# Patient Record
Sex: FEMALE | Race: AMERICAN INDIAN OR ALASKA NATIVE
[De-identification: names, ages, dates, MRNs, and addresses within clinical notes are randomized per-mention and may not be internally consistent; named-entity substitution may affect disease eponyms.]

---

## 2021-04-16 ENCOUNTER — HOSPITAL ENCOUNTER (INPATIENT)
Dept: HOSPITAL 5 - ED | Age: 71
LOS: 5 days | Discharge: HOSPICE | DRG: 208 | End: 2021-04-21
Attending: INTERNAL MEDICINE | Admitting: INTERNAL MEDICINE
Payer: SELF-PAY

## 2021-04-16 DIAGNOSIS — I67.2: ICD-10-CM

## 2021-04-16 DIAGNOSIS — U07.1: Primary | ICD-10-CM

## 2021-04-16 DIAGNOSIS — E43: ICD-10-CM

## 2021-04-16 DIAGNOSIS — C79.31: ICD-10-CM

## 2021-04-16 DIAGNOSIS — E87.1: ICD-10-CM

## 2021-04-16 DIAGNOSIS — Z82.49: ICD-10-CM

## 2021-04-16 DIAGNOSIS — Z79.891: ICD-10-CM

## 2021-04-16 DIAGNOSIS — C34.90: ICD-10-CM

## 2021-04-16 DIAGNOSIS — I63.9: ICD-10-CM

## 2021-04-16 DIAGNOSIS — G93.41: ICD-10-CM

## 2021-04-16 DIAGNOSIS — E87.8: ICD-10-CM

## 2021-04-16 DIAGNOSIS — R29.726: ICD-10-CM

## 2021-04-16 DIAGNOSIS — F03.90: ICD-10-CM

## 2021-04-16 DIAGNOSIS — E16.2: ICD-10-CM

## 2021-04-16 DIAGNOSIS — Z79.01: ICD-10-CM

## 2021-04-16 DIAGNOSIS — Z63.4: ICD-10-CM

## 2021-04-16 DIAGNOSIS — J96.01: ICD-10-CM

## 2021-04-16 DIAGNOSIS — Z79.899: ICD-10-CM

## 2021-04-16 LAB
ALBUMIN SERPL-MCNC: 3.7 G/DL (ref 3.9–5)
ALT SERPL-CCNC: 16 UNITS/L (ref 7–56)
APTT BLD: 26 SEC. (ref 24.2–36.6)
BASOPHILS # (AUTO): 0 K/MM3 (ref 0–0.1)
BASOPHILS NFR BLD AUTO: 0.1 % (ref 0–1.8)
BENZODIAZEPINES SCREEN,URINE: (no result)
BILIRUB UR QL STRIP: (no result)
BLOOD UR QL VISUAL: (no result)
BUN SERPL-MCNC: 9 MG/DL (ref 7–17)
BUN/CREAT SERPL: 15 %
CALCIUM SERPL-MCNC: 8.9 MG/DL (ref 8.4–10.2)
EOSINOPHIL # BLD AUTO: 0 K/MM3 (ref 0–0.4)
EOSINOPHIL NFR BLD AUTO: 0 % (ref 0–4.3)
HCT VFR BLD CALC: 43.7 % (ref 30.3–42.9)
HEMOLYSIS INDEX: 46
HGB BLD-MCNC: 14.7 GM/DL (ref 10.1–14.3)
INR PPP: 0.98 (ref 0.87–1.13)
LYMPHOCYTES # BLD AUTO: 0.7 K/MM3 (ref 1.2–5.4)
LYMPHOCYTES NFR BLD AUTO: 6.2 % (ref 13.4–35)
MCHC RBC AUTO-ENTMCNC: 34 % (ref 30–34)
MCV RBC AUTO: 83 FL (ref 79–97)
METHADONE SCREEN,URINE: (no result)
MONOCYTES # (AUTO): 0.5 K/MM3 (ref 0–0.8)
MONOCYTES % (AUTO): 4.5 % (ref 0–7.3)
MUCOUS THREADS #/AREA URNS HPF: (no result) /HPF
OPIATE SCREEN,URINE: (no result)
PH UR STRIP: 6 [PH] (ref 5–7)
PLATELET # BLD: 294 K/MM3 (ref 140–440)
RBC # BLD AUTO: 5.27 M/MM3 (ref 3.65–5.03)
RBC #/AREA URNS HPF: 1 /HPF (ref 0–6)
UROBILINOGEN UR-MCNC: < 2 MG/DL (ref ?–2)
WBC #/AREA URNS HPF: 1 /HPF (ref 0–6)

## 2021-04-16 PROCEDURE — 80307 DRUG TEST PRSMV CHEM ANLYZR: CPT

## 2021-04-16 PROCEDURE — 0BH17EZ INSERTION OF ENDOTRACHEAL AIRWAY INTO TRACHEA, VIA NATURAL OR ARTIFICIAL OPENING: ICD-10-PCS | Performed by: INTERNAL MEDICINE

## 2021-04-16 PROCEDURE — 82553 CREATINE MB FRACTION: CPT

## 2021-04-16 PROCEDURE — 82805 BLOOD GASES W/O2 SATURATION: CPT

## 2021-04-16 PROCEDURE — 80320 DRUG SCREEN QUANTALCOHOLS: CPT

## 2021-04-16 PROCEDURE — 96375 TX/PRO/DX INJ NEW DRUG ADDON: CPT

## 2021-04-16 PROCEDURE — 80048 BASIC METABOLIC PNL TOTAL CA: CPT

## 2021-04-16 PROCEDURE — 94002 VENT MGMT INPAT INIT DAY: CPT

## 2021-04-16 PROCEDURE — 82962 GLUCOSE BLOOD TEST: CPT

## 2021-04-16 PROCEDURE — 96374 THER/PROPH/DIAG INJ IV PUSH: CPT

## 2021-04-16 PROCEDURE — 85025 COMPLETE CBC W/AUTO DIFF WBC: CPT

## 2021-04-16 PROCEDURE — 93005 ELECTROCARDIOGRAM TRACING: CPT

## 2021-04-16 PROCEDURE — 86140 C-REACTIVE PROTEIN: CPT

## 2021-04-16 PROCEDURE — 85670 THROMBIN TIME PLASMA: CPT

## 2021-04-16 PROCEDURE — 87205 SMEAR GRAM STAIN: CPT

## 2021-04-16 PROCEDURE — 36415 COLL VENOUS BLD VENIPUNCTURE: CPT

## 2021-04-16 PROCEDURE — 83735 ASSAY OF MAGNESIUM: CPT

## 2021-04-16 PROCEDURE — 74018 RADEX ABDOMEN 1 VIEW: CPT

## 2021-04-16 PROCEDURE — 81001 URINALYSIS AUTO W/SCOPE: CPT

## 2021-04-16 PROCEDURE — 4A033R1 MEASUREMENT OF ARTERIAL SATURATION, PERIPHERAL, PERCUTANEOUS APPROACH: ICD-10-PCS | Performed by: INTERNAL MEDICINE

## 2021-04-16 PROCEDURE — 82140 ASSAY OF AMMONIA: CPT

## 2021-04-16 PROCEDURE — 84100 ASSAY OF PHOSPHORUS: CPT

## 2021-04-16 PROCEDURE — 84443 ASSAY THYROID STIM HORMONE: CPT

## 2021-04-16 PROCEDURE — 85007 BL SMEAR W/DIFF WBC COUNT: CPT

## 2021-04-16 PROCEDURE — G0480 DRUG TEST DEF 1-7 CLASSES: HCPCS

## 2021-04-16 PROCEDURE — 5A1945Z RESPIRATORY VENTILATION, 24-96 CONSECUTIVE HOURS: ICD-10-PCS | Performed by: INTERNAL MEDICINE

## 2021-04-16 PROCEDURE — U0003 INFECTIOUS AGENT DETECTION BY NUCLEIC ACID (DNA OR RNA); SEVERE ACUTE RESPIRATORY SYNDROME CORONAVIRUS 2 (SARS-COV-2) (CORONAVIRUS DISEASE [COVID-19]), AMPLIFIED PROBE TECHNIQUE, MAKING USE OF HIGH THROUGHPUT TECHNOLOGIES AS DESCRIBED BY CMS-2020-01-R: HCPCS

## 2021-04-16 PROCEDURE — 71045 X-RAY EXAM CHEST 1 VIEW: CPT

## 2021-04-16 PROCEDURE — 87070 CULTURE OTHR SPECIMN AEROBIC: CPT

## 2021-04-16 PROCEDURE — 82550 ASSAY OF CK (CPK): CPT

## 2021-04-16 PROCEDURE — 85730 THROMBOPLASTIN TIME PARTIAL: CPT

## 2021-04-16 PROCEDURE — 36600 WITHDRAWAL OF ARTERIAL BLOOD: CPT

## 2021-04-16 PROCEDURE — 70450 CT HEAD/BRAIN W/O DYE: CPT

## 2021-04-16 PROCEDURE — 93970 EXTREMITY STUDY: CPT

## 2021-04-16 PROCEDURE — 70496 CT ANGIOGRAPHY HEAD: CPT

## 2021-04-16 PROCEDURE — 84484 ASSAY OF TROPONIN QUANT: CPT

## 2021-04-16 PROCEDURE — 85610 PROTHROMBIN TIME: CPT

## 2021-04-16 PROCEDURE — 80053 COMPREHEN METABOLIC PANEL: CPT

## 2021-04-16 PROCEDURE — 70498 CT ANGIOGRAPHY NECK: CPT

## 2021-04-16 PROCEDURE — 94003 VENT MGMT INPAT SUBQ DAY: CPT

## 2021-04-16 PROCEDURE — 84145 PROCALCITONIN (PCT): CPT

## 2021-04-16 SDOH — SOCIAL STABILITY - SOCIAL INSECURITY: DISSAPEARANCE AND DEATH OF FAMILY MEMBER: Z63.4

## 2021-04-16 NOTE — CAT SCAN REPORT
CTA neck without and with intravenous contrast material



CLINICAL HISTORY:



MAIN



TECHNIQUE:



Following acquisition of a timing bolus 0.625 mm thick contiguous axial scans were obtained from aort
ic arch to the skull base during rapid bolus intravenous contrast infusion. In addition to evaluation
 of axial source images multiplanar reconstructions were produced and reviewed for this report. 3 eva
ne MIP reconstructions were produced and reviewed.



Contrast dose report:



Omnipaque 350:  100 ml, administered intravenously



All CT examinations performed at this facility utilize modulated dose reduction, iterative reconstruc
tion or weight-based dosing, as appropriate, to obtain a radiation dose which is as low as can reason
ably be achieved.





FINDINGS:



Thoracic aorta:No abnormalities are identified along the course of the thoracic aorta..The origins of
 the great vessels have an unremarkable appearance. Brachiocephalic artery, left common carotid arter
y origin and left subclavian artery all have an unremarkable appearance.



Right carotid artery:No abnormalities are seen along the course of the RCCA, at the right carotid bif
urcation or along the cervical portions of the SHYANN.



Left carotid artery: No abnormalities are noted along the course of the left common carotid artery, a
t the left carotid bifurcation or along the course of the cervical segments of the LICA.



Posterior circulation:The vertebral arteries have an unremarkable appearance. Both vertebral arteries
 contribute to the basilar artery origin. The basilar artery has an unremarkable appearance.



The degree of stenosis, if any, is determined utilizing NASCET like criteria.  In this case there is 
no indication of hemodynamically significant stenosis at the carotid bifurcations or elsewhere.



Evaluation of the lungs is remarkable for multiple (too numerous to count) metastatic deposits rangin
g in size from several millimeters in diameter up to 3.5 cm in greatest dimension. Additionally noted
 is a large is dominant mass located posterior to the armin. Based on location of the nasogastric tu
be this large mass displaces the esophagus infarct the left of the midline. The mass produces promine
nt compression of the right and left main bronchi, left lower lobe bronchus and bronchus intermedius.
 These findings reflect the presence of extensive pulmonary and mediastinal metastases.



Multiple low-attenuation lesions are seen within the thyroid gland. Multinodular goiter could be cons
idered. In light of findings in the lung apices the possibility of metastatic disease to the thyroid 
gland is considered. There is a large mass in the right supraclavicular fossa likely a conglomerate n
odal metastasis. Multiple subcutaneous metastases are also observed.



Evaluation of the cervical spine is remarkable for widespread cervical spondylosis.



IMPRESSION:





1. No indication of hemodynamically significant stenosis at the carotid bifurcations or elsewhere.

2. Widespread metastatic disease to lung, superior mediastinum, right supraclavicular fossa and possi
igor thyroid gland. In addition innumerable subcutaneous metastases are identified. 





============================================

IMPORTANT FINDING:

Time of Communication (CST/CDT): 1600 hours Central standard time

Licensed Practitioner Receiving Report: Dr. Nunez of the [facility] emergency d
epartment. 







============================================





Signer Name: Boyd Davis MD 

Signed: 4/16/2021 5:05 PM

Workstation Name: [id number]

## 2021-04-16 NOTE — HISTORY AND PHYSICAL REPORT
History of Present Illness


Chief complaint: 





Unresponsive


History of present illness: 


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 

Atherosclerosis, Metastatic Lung Neoplasm presents to ED for evaluation.  

Patient is intubated and on ventilatory support at the time my evaluation and is

unable to provide history.  Patient history is provided by EMS staff, ED staff, 

as well as patient daughter who was available by telephone to discuss patient 

history.  As per daughter, the patient was in her usual state of health with a 

no last known well time of 2000 hrs. overnight.  Patient was found unresponsive 

by family this morning.  EMS was notified and upon arrival the patient was found

to be in distress and subsequently transported to [facility] for further care and 

evaluation of the aforementioned symptoms.  The patient was seen and evaluated 

in the emergency department.  All lab and imaging studies reviewed.  The patient

was found to have a focal neurologic deficit with symptoms consistent with CVA. 

The patient was also found to have hypoxemia as well as an inability to protect 

her airway and was intubated and placed on ventilatory support.  The patient was

also found to have acute encephalopathy, as well as metastatic lung cancer.  The

patient was admitted to ICU due to increased risk of the development of multiple

organ system failure.  Patient found to have poor prognosis.  Patient daughter 

denies reports of fever, chills, chest pain, palpitation, productive cough, skin

rash, recent ill contacts, or known exposure to COVID-19.  No prior admission 

for review.  No medication listed for reconciliation at the time of my 

admission.  Advanced care planning conducted in ED. neurology team consulted in 

ED.  Critical care team notified in ED.





Past History


Past Medical History: cancer, stroke


Past Surgical History: No surgical history, Other (Reviewed)


Social history: .  denies: smoking, alcohol abuse, prescription drug 

abuse


Family history: hypertension





Medications and Allergies


                                    Allergies











Allergy/AdvReac Type Severity Reaction Status Date / Time


 


No Known Allergies Allergy   Unverified 04/16/21 14:09











Active Meds: 


Active Medications





Hydrophilic Ointment (Lip Therapy Vaseline)  1 applic TP Q2HR PRN


   PRN Reason: Dry Lips


Propofol (Diprivan 10 Mg/Ml)  1,000 mg in 100 mls @ 0 mls/hr IV TITR ANEL; 

Protocol


Sodium Chloride (Nacl 0.9% 1000 Ml)  1,000 mls @ 125 mls/hr IV ONCE ONE


   Stop: 04/16/21 22:46


   Last Admin: 04/16/21 16:03 Dose:  125 mls/hr


   Documented by: 


Multi-Ingred Cream/Lotion/Oil/Oint (Mineral Oil/Petrolatum, White Ophth Oint 3.5

Gm)  1 applic OU Q4HR PRN


   PRN Reason: Dry Eye(s)











Review of Systems


ROS unobtainable: due to endotracheal tube, due to mental status





Exam





- Constitutional


Vitals: 


                                        











Temp Pulse Resp BP Pulse Ox


 


    80   14   104/53   95 


 


    04/16/21 16:56  04/16/21 15:30  04/16/21 16:56  04/16/21 16:56











General appearance: Present: severe distress, cachectic





- EENT


Eyes: Present: miosis


ENT: hearing decreased





- Neck


Neck: Present: supple, normal ROM





- Respiratory


Respiratory effort: labored


Respiratory: bilateral: diminished, rhonchi





- Cardiovascular


Heart Sounds: Present: S1 & S2.  Absent: rub, click





- Extremities


Extremities: pulses symmetrical, No edema


Peripheral Pulses: within normal limits





- Abdominal


General gastrointestinal: Present: soft, non-tender, non-distended, normal bowel

sounds


Female genitourinary: Present: normal





- Musculoskeletal


Musculoskeletal: generalized weakness





- Psychiatric


Psychiatric: no appropriate mood/affect, no intact judgment & insight, no memory

intact





- Neurologic


Neurologic: no CNII-XII intact, focal deficits, no moves all extremities, no 

gait normal





HEART Score





- HEART Score


Troponin: 


                                        











Troponin T  0.010 ng/mL (0.00-0.029)   04/16/21  14:06    














Results





- Labs


CBC & Chem 7: 


                                 04/16/21 14:06





                                 04/16/21 14:06


Labs: 


                              Abnormal lab results











  04/16/21 04/16/21 04/16/21 Range/Units





  14:00 14:06 14:06 


 


RBC   5.27 H   (3.65-5.03)  M/mm3


 


Hgb   14.7 H   (10.1-14.3)  gm/dl


 


Hct   43.7 H   (30.3-42.9)  %


 


RDW   15.8 H   (13.2-15.2)  %


 


Lymph % (Auto)   6.2 L   (13.4-35.0)  %


 


Lymph # (Auto)   0.7 L   (1.2-5.4)  K/mm3


 


Seg Neutrophils %   89.2 H   (40.0-70.0)  %


 


Seg Neutrophils #   9.5 H   (1.8-7.7)  K/mm3


 


Thrombin Time    19.8 H  (15.1-19.6)  Sec.


 


POC ABG pO2     ()  mmHg


 


ABG Oxyhemoglobin     (94-98)  


 


ABG Glucose     (65-95)  mg/dL


 


Sodium     (137-145)  mmol/L


 


Chloride     ()  mmol/L


 


Glucose     ()  mg/dL


 


POC Glucose  150 H    ()  mg/dL


 


Total Creatine Kinase     ()  units/L


 


Total Protein     (6.3-8.2)  g/dL


 


Albumin     (3.9-5)  g/dL


 


Arterial Blood Glucose     (65-95)  mg/dL


 


Arterial Blood Ionized Calcium     (4.6-5.3)  mg/dL














  04/16/21 04/16/21 Range/Units





  14:06 14:42 


 


RBC    (3.65-5.03)  M/mm3


 


Hgb    (10.1-14.3)  gm/dl


 


Hct    (30.3-42.9)  %


 


RDW    (13.2-15.2)  %


 


Lymph % (Auto)    (13.4-35.0)  %


 


Lymph # (Auto)    (1.2-5.4)  K/mm3


 


Seg Neutrophils %    (40.0-70.0)  %


 


Seg Neutrophils #    (1.8-7.7)  K/mm3


 


Thrombin Time    (15.1-19.6)  Sec.


 


POC ABG pO2   71.3 L  ()  mmHg


 


ABG Oxyhemoglobin   92.6 L  (94-98)  


 


ABG Glucose   118 H  (65-95)  mg/dL


 


Sodium  129 L   (137-145)  mmol/L


 


Chloride  93 L   ()  mmol/L


 


Glucose  155 H   ()  mg/dL


 


POC Glucose    ()  mg/dL


 


Total Creatine Kinase  185 H   ()  units/L


 


Total Protein  10.4 H   (6.3-8.2)  g/dL


 


Albumin  3.7 L   (3.9-5)  g/dL


 


Arterial Blood Glucose   118 H  (65-95)  mg/dL


 


Arterial Blood Ionized Calcium   4.4 L  (4.6-5.3)  mg/dL














Assessment and Plan





- Patient Problems


(1) Acute hypoxemic respiratory failure


Current Visit: Yes   Status: Acute   


Plan to address problem: 


Patient intubated and placed on ventilatory support.  Wean vent as tolerated, 

ABG, sedation holiday, daily spontaneous breathing trial, critical care team 

consulted in ED.





The high probability of a clinically significant, sudden or life threatening 

deterioration of the [cardiac, pulmonary, neuro] system(s) required my full and 

direct attention, intervention and personal management. The aggregate critical 

care time was [90] minutes. This time is in addition to time spent performing 

reported procedures but includes the following: 





[x] Data Review and interpretation 





[x] Patient assessment and monitoring of vital signs 





[x] Documentation 





[x] Medication orders and management








(2) Metastatic lung cancer (metastasis from lung to other site)


Current Visit: Yes   Status: Acute   


Qualifiers: 


   Laterality: right   Qualified Code(s): C34.91 - Malignant neoplasm of 

unspecified part of right bronchus or lung   


Plan to address problem: 


Supportive care.  Pain management, chest x-ray.  Patient daughter reports that 

patient declined treatment for metastatic lung cancer.








(3) CVA (cerebral vascular accident)


Current Visit: Yes   Status: Acute   


Plan to address problem: 


CVA protocol: Teleneurology consulted in ED.  Patient has poor prognosis.  

Further testing and care if patient becomes more medically stable.








(4) Metabolic encephalopathy


Current Visit: Yes   Status: Acute   


Plan to address problem: 


CT head, neuro check, seizure precautions, supportive care.








(5) Hyponatremia syndrome


Current Visit: Yes   Status: Acute   


Plan to address problem: 


IV fluid resuscitation therapy, BMP, repeat BMP in a.m.








(6) DVT prophylaxis


Current Visit: Yes   Status: Acute   





(7) Advance care planning


Current Visit: Yes   Status: Acute   


Plan to address problem: 


Disease education conducted, care plan discussed, diagnoses discussed, prognosis

 discussed, patient daughter Mindi Unger, notified via telephone.  Patient 

daughter informed of poor prognosis.  Patient daughter acknowledges 

understanding instructions.  Patient is full code for now.  Patient daughter 

will contact family members and discussed patient's wishes.  +30 minutes.

## 2021-04-16 NOTE — XRAY REPORT
CHEST 1 VIEW 



INDICATION / CLINICAL INFORMATION:

ETT placement.



COMPARISON: 

None available.



FINDINGS:



SUPPORT DEVICES: Tracheal tube, nasogastric tube

HEART / MEDIASTINUM: No significant abnormality. 

LUNGS / PLEURA: Numerous pulmonary nodules with a large right infrahilar mass No pneumothorax. 



ADDITIONAL FINDINGS: No significant additional findings.



IMPRESSION:

Endotracheal tube has been placed and is approximately 2 cm above the armin. Numerous pulmonary nodu
les and masses are seen



Signer Name: Juan Mahoney MD FACR 

Signed: 4/16/2021 2:30 PM

Workstation Name: [id number]

## 2021-04-16 NOTE — EMERGENCY DEPARTMENT REPORT
HPI





- General


Time Seen by Provider: 04/16/21 13:33





- HPI


HPI: 





This is a 71-year-old -American female who presents to the emergency 

department with altered mental status, a right-sided facial droop, and a left-

sided gaze preference.  The patient is unknown to me and does not appear to have

been to this emergency department or hospital previously.  Per EMS and family, 

the patient's last known well time was about 8 PM last night.  She had not yet 

come out of her room or "woken up" yet for the day so the family became 

concerned and went to check on her and found her unresponsive.  EMS found the 

patient to have a critically low blood sugar level and the patient was given an 

amp of D50.  After receiving the glucose her blood sugar went up to about 250 

but the patient did not have any change in her mental status.  Per EMS, family 

says that she currently has some type of cancer that is not currently being 

treated.  She also allegedly has a history of a previous CVA but it is unknown 

if she has any residual deficits.  However family told EMS that she normally 

does ambulate and converse.





I was later able to speak with the patient's daughter.  The patient does have a 

history of a CVA from 2 years ago that left her with only some very mild aphasia

in which "she sometimes has trouble getting the words out but eventually does." 

She has a history of primary lung cancer and the daughter says that it recently 

spread "everywhere."  She is not a tobacco smoker.  No known drug allergies.  

The patient is normally ambulatory and conversive without any altered mental 

status.





ED Review of Systems


ROS: 


Stated complaint: POSSIBLE STROKE


Other details as noted in HPI





Comment: Unobtainable due to pts medical conditions





Physical Exam





- Physical Exam


Physical Exam: 





GENERAL: The patient is ill-appearing.


HENT: Normocephalic.  Atraumatic.    Patient has moist mucous membranes.


EYES: Pupils equal reactive to light bilaterally.  Left-sided gaze preference.


NECK: Supple. Trachea is midline.


CHEST/LUNGS: Clear to auscultation.  Shallow breaths with snoring respirations.


HEART/CARDIOVASCULAR: Regular.  There is no tachycardia.  There is no murmur.


ABDOMEN: Abdomen is soft, nontender.  Patient has normal bowel sounds.  There is

no abdominal distention.


SKIN: Skin is warm and dry.  Patient has multiple soft tissue tumor-like growths

around her neck.


NEURO: Patient's eyes are open spontaneously but otherwise she appears 

nonresponsive.  Nonverbal.  Right-sided facial droop.  She is nonresponsive to 

verbal or tactile stimuli.


MUSCULOSKELETAL: There is no obvious deformity. 





ED Course





- Consultations


Consultation #1: 





04/16/21 14:11


Patient was seen by the telemedicine neurologist to asked for the patient to 

receive a CT angiography of the head and neck to evaluate for large vessel 

occlusion.  Both the neurologist, as well as the radiologist, read the CT scan 

as showing a subacute infarct, a chronic infarct, and a calcified meningioma.





- ABG Interpretation


Ph: 7.37


PCO2: 45


PO2: 71


Bicarbonate: 25


Interpretation: other (Mild hypoxemia)





- Intubation


Time Out Performed: Yes


Sedative: Etomidate


Mg Given: 20


Paralytic: Succinylcholine


Mg Given: 70


Laryngoscope: Pricilla


Size: 4


ET Tube Size: 7.5


Tube Secured Depth (cm): 24


Tube Secured Location: lips


Tube Placement Confirmation: visualized tube passing t, equal breath sounds 

bilat, no breath sounds over epi, confirmation by capnometr


Patient Tolerated Procedure: well


Intubation Complications: none





ED Medical Decision Making





- Lab Data


Result diagrams: 


                                 04/16/21 14:06





                                 04/16/21 14:06





                                   Lab Results











  04/16/21 04/16/21 04/16/21 Range/Units





  14:00 14:06 14:06 


 


WBC   10.7   (4.5-11.0)  K/mm3


 


RBC   5.27 H   (3.65-5.03)  M/mm3


 


Hgb   14.7 H   (10.1-14.3)  gm/dl


 


Hct   43.7 H   (30.3-42.9)  %


 


MCV   83   (79-97)  fl


 


MCH   28   (28-32)  pg


 


MCHC   34   (30-34)  %


 


RDW   15.8 H   (13.2-15.2)  %


 


Plt Count   294   (140-440)  K/mm3


 


Lymph % (Auto)   6.2 L   (13.4-35.0)  %


 


Mono % (Auto)   4.5   (0.0-7.3)  %


 


Eos % (Auto)   0.0   (0.0-4.3)  %


 


Baso % (Auto)   0.1   (0.0-1.8)  %


 


Lymph # (Auto)   0.7 L   (1.2-5.4)  K/mm3


 


Mono # (Auto)   0.5   (0.0-0.8)  K/mm3


 


Eos # (Auto)   0.0   (0.0-0.4)  K/mm3


 


Baso # (Auto)   0.0   (0.0-0.1)  K/mm3


 


Seg Neutrophils %   89.2 H   (40.0-70.0)  %


 


Seg Neutrophils #   9.5 H   (1.8-7.7)  K/mm3


 


PT    12.8  (12.2-14.9)  Sec.


 


INR    0.98  (0.87-1.13)  


 


APTT    26.0  (24.2-36.6)  Sec.


 


Thrombin Time    19.8 H  (15.1-19.6)  Sec.


 


ABG pH     (7.320-7.450)  


 


POC ABG pCO2     (32.0-48.0)  mmHg


 


POC ABG pO2     ()  mmHg


 


POC ABG HCO3     


 


ABG O2 Saturation     (0-100)  


 


POC ABG Base Excess     


 


ABG Hemoglobin     (12.0-17.5)  


 


ABG Oxyhemoglobin     (94-98)  


 


ABG Methemoglobin     (0.0-1.5)  


 


ABG Sodium     (136.0-145.0)  mmol/L


 


ABG Potassium     (3.40-4.50)  mmol/L


 


ABG Chloride     ()  mmol/L


 


ABG Glucose     (65-95)  mg/dL


 


Carboxyhemoglobin     (0.5-1.5)  


 


FiO2 %     


 


Sodium     (137-145)  mmol/L


 


Potassium     (3.6-5.0)  mmol/L


 


Chloride     ()  mmol/L


 


Carbon Dioxide     (22-30)  mmol/L


 


Anion Gap     mmol/L


 


BUN     (7-17)  mg/dL


 


Creatinine     (0.6-1.2)  mg/dL


 


Estimated GFR     ml/min


 


BUN/Creatinine Ratio     %


 


Glucose     ()  mg/dL


 


POC Glucose  150 H    ()  mg/dL


 


Calcium     (8.4-10.2)  mg/dL


 


Total Bilirubin     (0.1-1.2)  mg/dL


 


AST     (5-40)  units/L


 


ALT     (7-56)  units/L


 


Alkaline Phosphatase     ()  units/L


 


Ammonia     (25-60)  umol/L


 


Total Creatine Kinase     ()  units/L


 


CK-MB (CK-2)     (0.0-4.0)  ng/mL


 


CK-MB (CK-2) Rel Index     (0-4)  


 


Troponin T     (0.00-0.029)  ng/mL


 


Total Protein     (6.3-8.2)  g/dL


 


Albumin     (3.9-5)  g/dL


 


Albumin/Globulin Ratio     %


 


TSH     (0.270-4.200)  mlU/mL


 


Arterial Blood Glucose     (65-95)  mg/dL


 


Arterial Blood Ionized Calcium     (4.6-5.3)  mg/dL


 


Urine Color     (Yellow)  


 


Urine Turbidity     (Clear)  


 


Urine pH     (5.0-7.0)  


 


Ur Specific Gravity     (1.003-1.030)  


 


Urine Protein     (Negative)  mg/dL


 


Urine Glucose (UA)     (Negative)  mg/dL


 


Urine Ketones     (Negative)  mg/dL


 


Urine Blood     (Negative)  


 


Urine Nitrite     (Negative)  


 


Urine Bilirubin     (Negative)  


 


Urine Urobilinogen     (<2.0)  mg/dL


 


Ur Leukocyte Esterase     (Negative)  


 


Urine WBC (Auto)     (0.0-6.0)  /HPF


 


Urine RBC (Auto)     (0.0-6.0)  /HPF


 


U Epithel Cells (Auto)     (0-13.0)  /HPF


 


Urine Mucus     /HPF


 


Urine Opiates Screen     


 


Urine Methadone Screen     


 


Ur Barbiturates Screen     


 


Ur Phencyclidine Scrn     


 


Ur Amphetamines Screen     


 


U Benzodiazepines Scrn     


 


Urine Cocaine Screen     


 


U Marijuana (THC) Screen     


 


Drugs of Abuse Note     


 


Plasma/Serum Alcohol     (0-0.07)  %














  04/16/21 04/16/21 04/16/21 Range/Units





  14:06 14:06 14:06 


 


WBC     (4.5-11.0)  K/mm3


 


RBC     (3.65-5.03)  M/mm3


 


Hgb     (10.1-14.3)  gm/dl


 


Hct     (30.3-42.9)  %


 


MCV     (79-97)  fl


 


MCH     (28-32)  pg


 


MCHC     (30-34)  %


 


RDW     (13.2-15.2)  %


 


Plt Count     (140-440)  K/mm3


 


Lymph % (Auto)     (13.4-35.0)  %


 


Mono % (Auto)     (0.0-7.3)  %


 


Eos % (Auto)     (0.0-4.3)  %


 


Baso % (Auto)     (0.0-1.8)  %


 


Lymph # (Auto)     (1.2-5.4)  K/mm3


 


Mono # (Auto)     (0.0-0.8)  K/mm3


 


Eos # (Auto)     (0.0-0.4)  K/mm3


 


Baso # (Auto)     (0.0-0.1)  K/mm3


 


Seg Neutrophils %     (40.0-70.0)  %


 


Seg Neutrophils #     (1.8-7.7)  K/mm3


 


PT     (12.2-14.9)  Sec.


 


INR     (0.87-1.13)  


 


APTT     (24.2-36.6)  Sec.


 


Thrombin Time     (15.1-19.6)  Sec.


 


ABG pH     (7.320-7.450)  


 


POC ABG pCO2     (32.0-48.0)  mmHg


 


POC ABG pO2     ()  mmHg


 


POC ABG HCO3     


 


ABG O2 Saturation     (0-100)  


 


POC ABG Base Excess     


 


ABG Hemoglobin     (12.0-17.5)  


 


ABG Oxyhemoglobin     (94-98)  


 


ABG Methemoglobin     (0.0-1.5)  


 


ABG Sodium     (136.0-145.0)  mmol/L


 


ABG Potassium     (3.40-4.50)  mmol/L


 


ABG Chloride     ()  mmol/L


 


ABG Glucose     (65-95)  mg/dL


 


Carboxyhemoglobin     (0.5-1.5)  


 


FiO2 %     


 


Sodium  129 L    (137-145)  mmol/L


 


Potassium  5.0    (3.6-5.0)  mmol/L


 


Chloride  93 L    ()  mmol/L


 


Carbon Dioxide  23    (22-30)  mmol/L


 


Anion Gap  18    mmol/L


 


BUN  9    (7-17)  mg/dL


 


Creatinine  0.6    (0.6-1.2)  mg/dL


 


Estimated GFR  > 60    ml/min


 


BUN/Creatinine Ratio  15    %


 


Glucose  155 H    ()  mg/dL


 


POC Glucose     ()  mg/dL


 


Calcium  8.9    (8.4-10.2)  mg/dL


 


Total Bilirubin  0.60    (0.1-1.2)  mg/dL


 


AST  38    (5-40)  units/L


 


ALT  16    (7-56)  units/L


 


Alkaline Phosphatase  92    ()  units/L


 


Ammonia    33.0  (25-60)  umol/L


 


Total Creatine Kinase  185 H    ()  units/L


 


CK-MB (CK-2)  1.1    (0.0-4.0)  ng/mL


 


CK-MB (CK-2) Rel Index  0.5    (0-4)  


 


Troponin T  0.010    (0.00-0.029)  ng/mL


 


Total Protein  10.4 H    (6.3-8.2)  g/dL


 


Albumin  3.7 L    (3.9-5)  g/dL


 


Albumin/Globulin Ratio  0.6    %


 


TSH     (0.270-4.200)  mlU/mL


 


Arterial Blood Glucose     (65-95)  mg/dL


 


Arterial Blood Ionized Calcium     (4.6-5.3)  mg/dL


 


Urine Color     (Yellow)  


 


Urine Turbidity     (Clear)  


 


Urine pH     (5.0-7.0)  


 


Ur Specific Gravity     (1.003-1.030)  


 


Urine Protein     (Negative)  mg/dL


 


Urine Glucose (UA)     (Negative)  mg/dL


 


Urine Ketones     (Negative)  mg/dL


 


Urine Blood     (Negative)  


 


Urine Nitrite     (Negative)  


 


Urine Bilirubin     (Negative)  


 


Urine Urobilinogen     (<2.0)  mg/dL


 


Ur Leukocyte Esterase     (Negative)  


 


Urine WBC (Auto)     (0.0-6.0)  /HPF


 


Urine RBC (Auto)     (0.0-6.0)  /HPF


 


U Epithel Cells (Auto)     (0-13.0)  /HPF


 


Urine Mucus     /HPF


 


Urine Opiates Screen     


 


Urine Methadone Screen     


 


Ur Barbiturates Screen     


 


Ur Phencyclidine Scrn     


 


Ur Amphetamines Screen     


 


U Benzodiazepines Scrn     


 


Urine Cocaine Screen     


 


U Marijuana (THC) Screen     


 


Drugs of Abuse Note     


 


Plasma/Serum Alcohol   0.01   (0-0.07)  %














  04/16/21 04/16/21 04/16/21 Range/Units





  14:06 14:42 14:48 


 


WBC     (4.5-11.0)  K/mm3


 


RBC     (3.65-5.03)  M/mm3


 


Hgb     (10.1-14.3)  gm/dl


 


Hct     (30.3-42.9)  %


 


MCV     (79-97)  fl


 


MCH     (28-32)  pg


 


MCHC     (30-34)  %


 


RDW     (13.2-15.2)  %


 


Plt Count     (140-440)  K/mm3


 


Lymph % (Auto)     (13.4-35.0)  %


 


Mono % (Auto)     (0.0-7.3)  %


 


Eos % (Auto)     (0.0-4.3)  %


 


Baso % (Auto)     (0.0-1.8)  %


 


Lymph # (Auto)     (1.2-5.4)  K/mm3


 


Mono # (Auto)     (0.0-0.8)  K/mm3


 


Eos # (Auto)     (0.0-0.4)  K/mm3


 


Baso # (Auto)     (0.0-0.1)  K/mm3


 


Seg Neutrophils %     (40.0-70.0)  %


 


Seg Neutrophils #     (1.8-7.7)  K/mm3


 


PT     (12.2-14.9)  Sec.


 


INR     (0.87-1.13)  


 


APTT     (24.2-36.6)  Sec.


 


Thrombin Time     (15.1-19.6)  Sec.


 


ABG pH   7.37   (7.320-7.450)  


 


POC ABG pCO2   45.9   (32.0-48.0)  mmHg


 


POC ABG pO2   71.3 L   ()  mmHg


 


POC ABG HCO3   25.9   


 


ABG O2 Saturation   93.5   (0-100)  


 


POC ABG Base Excess   0.3   


 


ABG Hemoglobin   12.6   (12.0-17.5)  


 


ABG Oxyhemoglobin   92.6 L   (94-98)  


 


ABG Methemoglobin   0.3   (0.0-1.5)  


 


ABG Sodium   136.5   (136.0-145.0)  mmol/L


 


ABG Potassium   3.5   (3.40-4.50)  mmol/L


 


ABG Chloride   103.0   ()  mmol/L


 


ABG Glucose   118 H   (65-95)  mg/dL


 


Carboxyhemoglobin   0.7   (0.5-1.5)  


 


FiO2 %   40   


 


Sodium     (137-145)  mmol/L


 


Potassium     (3.6-5.0)  mmol/L


 


Chloride     ()  mmol/L


 


Carbon Dioxide     (22-30)  mmol/L


 


Anion Gap     mmol/L


 


BUN     (7-17)  mg/dL


 


Creatinine     (0.6-1.2)  mg/dL


 


Estimated GFR     ml/min


 


BUN/Creatinine Ratio     %


 


Glucose     ()  mg/dL


 


POC Glucose     ()  mg/dL


 


Calcium     (8.4-10.2)  mg/dL


 


Total Bilirubin     (0.1-1.2)  mg/dL


 


AST     (5-40)  units/L


 


ALT     (7-56)  units/L


 


Alkaline Phosphatase     ()  units/L


 


Ammonia     (25-60)  umol/L


 


Total Creatine Kinase     ()  units/L


 


CK-MB (CK-2)     (0.0-4.0)  ng/mL


 


CK-MB (CK-2) Rel Index     (0-4)  


 


Troponin T     (0.00-0.029)  ng/mL


 


Total Protein     (6.3-8.2)  g/dL


 


Albumin     (3.9-5)  g/dL


 


Albumin/Globulin Ratio     %


 


TSH  1.450    (0.270-4.200)  mlU/mL


 


Arterial Blood Glucose   118 H   (65-95)  mg/dL


 


Arterial Blood Ionized Calcium   4.4 L   (4.6-5.3)  mg/dL


 


Urine Color     (Yellow)  


 


Urine Turbidity     (Clear)  


 


Urine pH     (5.0-7.0)  


 


Ur Specific Gravity     (1.003-1.030)  


 


Urine Protein     (Negative)  mg/dL


 


Urine Glucose (UA)     (Negative)  mg/dL


 


Urine Ketones     (Negative)  mg/dL


 


Urine Blood     (Negative)  


 


Urine Nitrite     (Negative)  


 


Urine Bilirubin     (Negative)  


 


Urine Urobilinogen     (<2.0)  mg/dL


 


Ur Leukocyte Esterase     (Negative)  


 


Urine WBC (Auto)     (0.0-6.0)  /HPF


 


Urine RBC (Auto)     (0.0-6.0)  /HPF


 


U Epithel Cells (Auto)     (0-13.0)  /HPF


 


Urine Mucus     /HPF


 


Urine Opiates Screen    Presumptive negative  


 


Urine Methadone Screen    Presumptive negative  


 


Ur Barbiturates Screen    Presumptive negative  


 


Ur Phencyclidine Scrn    Presumptive negative  


 


Ur Amphetamines Screen    Presumptive negative  


 


U Benzodiazepines Scrn    Presumptive negative  


 


Urine Cocaine Screen    Presumptive negative  


 


U Marijuana (THC) Screen    Presumptive negative  


 


Drugs of Abuse Note    Disclamer  


 


Plasma/Serum Alcohol     (0-0.07)  %














  04/16/21 Range/Units





  16:01 


 


WBC   (4.5-11.0)  K/mm3


 


RBC   (3.65-5.03)  M/mm3


 


Hgb   (10.1-14.3)  gm/dl


 


Hct   (30.3-42.9)  %


 


MCV   (79-97)  fl


 


MCH   (28-32)  pg


 


MCHC   (30-34)  %


 


RDW   (13.2-15.2)  %


 


Plt Count   (140-440)  K/mm3


 


Lymph % (Auto)   (13.4-35.0)  %


 


Mono % (Auto)   (0.0-7.3)  %


 


Eos % (Auto)   (0.0-4.3)  %


 


Baso % (Auto)   (0.0-1.8)  %


 


Lymph # (Auto)   (1.2-5.4)  K/mm3


 


Mono # (Auto)   (0.0-0.8)  K/mm3


 


Eos # (Auto)   (0.0-0.4)  K/mm3


 


Baso # (Auto)   (0.0-0.1)  K/mm3


 


Seg Neutrophils %   (40.0-70.0)  %


 


Seg Neutrophils #   (1.8-7.7)  K/mm3


 


PT   (12.2-14.9)  Sec.


 


INR   (0.87-1.13)  


 


APTT   (24.2-36.6)  Sec.


 


Thrombin Time   (15.1-19.6)  Sec.


 


ABG pH   (7.320-7.450)  


 


POC ABG pCO2   (32.0-48.0)  mmHg


 


POC ABG pO2   ()  mmHg


 


POC ABG HCO3   


 


ABG O2 Saturation   (0-100)  


 


POC ABG Base Excess   


 


ABG Hemoglobin   (12.0-17.5)  


 


ABG Oxyhemoglobin   (94-98)  


 


ABG Methemoglobin   (0.0-1.5)  


 


ABG Sodium   (136.0-145.0)  mmol/L


 


ABG Potassium   (3.40-4.50)  mmol/L


 


ABG Chloride   ()  mmol/L


 


ABG Glucose   (65-95)  mg/dL


 


Carboxyhemoglobin   (0.5-1.5)  


 


FiO2 %   


 


Sodium   (137-145)  mmol/L


 


Potassium   (3.6-5.0)  mmol/L


 


Chloride   ()  mmol/L


 


Carbon Dioxide   (22-30)  mmol/L


 


Anion Gap   mmol/L


 


BUN   (7-17)  mg/dL


 


Creatinine   (0.6-1.2)  mg/dL


 


Estimated GFR   ml/min


 


BUN/Creatinine Ratio   %


 


Glucose   ()  mg/dL


 


POC Glucose   ()  mg/dL


 


Calcium   (8.4-10.2)  mg/dL


 


Total Bilirubin   (0.1-1.2)  mg/dL


 


AST   (5-40)  units/L


 


ALT   (7-56)  units/L


 


Alkaline Phosphatase   ()  units/L


 


Ammonia   (25-60)  umol/L


 


Total Creatine Kinase   ()  units/L


 


CK-MB (CK-2)   (0.0-4.0)  ng/mL


 


CK-MB (CK-2) Rel Index   (0-4)  


 


Troponin T   (0.00-0.029)  ng/mL


 


Total Protein   (6.3-8.2)  g/dL


 


Albumin   (3.9-5)  g/dL


 


Albumin/Globulin Ratio   %


 


TSH   (0.270-4.200)  mlU/mL


 


Arterial Blood Glucose   (65-95)  mg/dL


 


Arterial Blood Ionized Calcium   (4.6-5.3)  mg/dL


 


Urine Color  Yellow  (Yellow)  


 


Urine Turbidity  Clear  (Clear)  


 


Urine pH  6.0  (5.0-7.0)  


 


Ur Specific Gravity  1.015  (1.003-1.030)  


 


Urine Protein  30 mg/dl  (Negative)  mg/dL


 


Urine Glucose (UA)  150  (Negative)  mg/dL


 


Urine Ketones  Neg  (Negative)  mg/dL


 


Urine Blood  Neg  (Negative)  


 


Urine Nitrite  Neg  (Negative)  


 


Urine Bilirubin  Neg  (Negative)  


 


Urine Urobilinogen  < 2.0  (<2.0)  mg/dL


 


Ur Leukocyte Esterase  Neg  (Negative)  


 


Urine WBC (Auto)  1.0  (0.0-6.0)  /HPF


 


Urine RBC (Auto)  1.0  (0.0-6.0)  /HPF


 


U Epithel Cells (Auto)  < 1.0  (0-13.0)  /HPF


 


Urine Mucus  Few  /HPF


 


Urine Opiates Screen   


 


Urine Methadone Screen   


 


Ur Barbiturates Screen   


 


Ur Phencyclidine Scrn   


 


Ur Amphetamines Screen   


 


U Benzodiazepines Scrn   


 


Urine Cocaine Screen   


 


U Marijuana (THC) Screen   


 


Drugs of Abuse Note   


 


Plasma/Serum Alcohol   (0-0.07)  %














- Radiology Data


Radiology results: report reviewed, image reviewed


interpreted by me: 





Chest x-ray shows appropriate placement of the endotracheal tube just above the 

armin.  No obvious pneumonia.  No pneumothorax.





CT head/brain wo con INDICATION / CLINICAL INFORMATION: 71 years Female; MAIN. 

TECHNIQUE: Routine CT head without contrast. All CT scans at this location are 

performed using CT dose reduction for ALARA by means of automated exposure cont

rol. COMPARISON: None. FINDINGS: BRAIN / INTRACRANIAL CONTENTS: There is an 

older infarct involving the right basal ganglia with encephalomalacia and mild 

ex vacuo dilatation of the anterior right lateral ventricle. However, th ere is 

less distinct decreased attenuation along the anterior right periventricular 

region measuring 2.7 cm AP indicative of more subacute infarct at. There is 

relative preservation of the posterior right putamen. There are chronic ischemic

changes along the left posterior periventricular region. There is otherwise mild

cerebral white matter disease most consistent with microvascular angiopathy. 

There is a calcified lesion along the posterior left cerebellum measuring 2.3 cm

transversely most consistent with a meningioma. This finding results in mass 

effect upon the posterior left cerebellum without significant vasogenic edema. 

There is no clear CT evidence of acute intracranial hemorrhage. ORBITS: No 

significant abnormality of visualized orbits. SINUSES / MASTOIDS: No significant

abnormality in the visualized paranasal sinuses or mastoid air cells. CRANIOCERV

ICAL JUNCTION: No significant abnormality. ADDITIONAL FINDINGS: There is mild 

incidental hyperostosis frontalis interna. There are also multiple well-

circumscribed nodules involving scalp which also appear to be incidental and may

reflect sebaceous cysts. IMPRESSION: 1. The findings are indicative of subacute 

infarct along the anterior right periventricular region as detailed above. There

is a more chronic appearing infarct along the adjacent right basal ganglia. 2. 

The findings are most consistent with a 2.3 cm calcified meningioma along the 

posterior left cerebellum. 





CT angio head INDICATION / CLINICAL INFORMATION: 71 years Female; MAIN. 

TECHNIQUE: Thin cut axial images obtained through the head during IV bolus 

contrast administration. Sagittal, coronal, and 3 plane MIP reconstructions 

performed by the technologist. NASCET type criteria used evaluate stenoses. 

Automated exposure control utilized for radiation reduction purposes. 

COMPARISON: None available. FINDINGS: INTERNAL CAROTID ARTERIES: No 

hemodynamically significant narrowing appreciated. However, there is mild 

narrowing in the anterior genu of the cavernous portion of the right internal c

arotid artery related to atherosclerotic disease. Overall, the atherosclerotic 

disease is greater on the right than the left. The left internal carotid artery 

is dominant when compared with the right, related to hypoplastic A1 segment on 

the right. VERTEBROBASILAR SYSTEM: No significant narrowing appreciated. DISTAL 

BRANCHES: Distal branches of the anterior, middle, and posterior cerebral 

arteries are fairly symmetric in appearance and number. As noted above, the A1 

segment on the right is hypoplastic. Mild to moderate narrowing is seen in the 

distal M1 segment on the left. No signs of thrombus appreciated. Areas of mild 

narrowing are seen in the proximal posterior cerebral arteries bilaterally. 

ANEURYSM: None identified. 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 

suggested on the right. Developmental venous anomaly seen in the inferior 

temporal gyrus on the left, somewhat anteriorly. 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 

the lateral aspect of the tentorium cerebelli on the left, projecting into the 

adjacent posterior fossa. There is mass effect on the left transverse sinus. The

right transverse sinus and right internal jugular veins are asymmetrically 

prominent, compared with the left. Subcutaneous, well-circumscribed, areas of 

soft tissue swelling seen on the left. Findings may be related to small 

sebaceous cysts. However, clinical correlation is recommended. No signs of under

lying calvarial abnormality. Similar findings seen elsewhere along the 

calvarium, bilaterally. Small, well-circumscribed parotid lesions seen 

peripherally on the left, superficially-pleomorphic adenoma might be considered.

IMPRESSION: 1. No signs of large vessel occlusion by thrombus on this CTA of the

head. 2. Area of narrowing identified, as described above. 3. Subcutaneous 

nodules and left parotid lesion, as described above. In addition to the above- 

mentioned diagnoses, metastatic disease has to be considered, given findings on 

this CTA of the neck, there is suggestion of diffuse lung parenchymal and 

subcutaneous nodules. 





CTA neck without and with intravenous contrast material CLINICAL HISTORY: MAIN 

TECHNIQUE: Following acquisition of a timing bolus 0.625 mm thick contiguous 

axial scans were obtained from aortic arch to the skull base during rapid bolus 

intravenous contrast infusion. In addition to evaluation of axial source images 

multiplanar reconstructions were produced and reviewed for this report. 3 plane 

MIP reconstructions were produced and reviewed. Contrast dose report: Omnipaque 

350: 100 ml, administered intravenously All CT examinations performed at this 

facility utilize modulated dose reduction, iterative reconstruction or weight-

based dosing, as appropriate, to obtain a radiation dose which is as low as can 

reasonably be achieved. FINDINGS: Thoracic aorta:No abnormalities are identified

along the course of the thoracic aorta..The origins of the great vessels have an

unremarkable appearance. Brachiocephalic artery, left common carotid artery 

origin and left subclavian artery all have an unremarkable appearance. Right 

carotid artery:No abnormalities are seen along the course of the RCCA, at the 

right carotid bifurcation or along the cervical portions of the SHYANN. Left 

carotid artery: No abnormalities are noted along the course of the left common 

carotid artery, at the left carotid bifurcation or along the course of the 

cervical segments of the LICA. Posterior circulation:The vertebral arteries have

an unremarkable appearance. Both vertebral arteries contribute to the basilar 

artery origin. The basilar artery has an unremarkable appearance. The degree of 

stenosis, if any, is determined utilizing NASCET like criteria. In this case 

there is no indication of hemodynamically significant stenosis at the carotid 

bifurcations or elsewhere. Evaluation of the lungs is remarkable for multiple 

(too numerous to count) metastatic deposits ranging in size from several 

millimeters in diameter up to 3.5 cm in greatest dimension. Additionally noted 

is a large is dominant mass located posterior to the armin. Based on location 

of the nasogastric tube this large mass displaces the esophagus infarct the left

of the midline. The mass produces prominent compression of the right and left 

main bronchi, left lower lobe bronchus and bronchus intermedius. These findings 

reflect the presence of extensive pulmonary and mediastinal metastases. Multiple

low-attenuation lesions are seen within the thyroid gland. Multinodular goiter 

could be considered. In light of findings in the lung apices the possibility of 

metastatic disease to the thyroid gland is considered. There is a large mass in 

the right supraclavicular fossa likely a conglomerate jack metastasis. Multiple

subcutaneous metastases are also observed. Evaluation of the cervical spine is 

remarkable for widespread cervical spondylosis. IMPRESSION: 1. No indication of 

hemodynamically significant stenosis at the carotid bifurcations or elsewhere. 

2. Widespread metastatic disease to lung, superior mediastinum, right 

supraclavicular fossa and possibly thyroid gland. In addition innumerable 

subcutaneous metastases are identified. 





- Medical Decision Making





This patient presents to the emergency department as a code stroke.  Family 

found her unresponsive this morning/early afternoon after having a last known 

well time of last night around 8 PM.  Initially EMS found the patient to have 

hypoglycemia with a critically low blood sugar.  After receiving a dose of D50 

the patient's blood sugar went up to around 200 but the patient still had 

deficits.  The patient is nonverbal with a right-sided facial droop and a 

left-sided gaze preference.





She had a CT scan of the head without contrast that did not show any bleed or l

arge vessel occlusion.  She was seen by the telemedicine neurologist to 

recommended CT angiography studies of the head and neck.





Shortly after returning from the initial CT scan of the head without contrast 

the patient began having snoring respirations and very shallow breaths.  She was

intubated for protection of airway.  Chest x-ray shows appropriate placement of 

the endotracheal tube.  No pneumonia or pneumothorax.





Patient later had CT angiography of the head and neck that does not show any 

large vessel occlusion but there are multiple areas of metastasis throughout the

head and neck. 





The patient's labs have been mostly unremarkable.  She is currently on a 

propofol drip for sedation.  Intensivist has been contacted and consulted.  The 

patient has been accepted for admission by the hospitalist, Dr. Senior.


Critical Care Time: Yes


Critical care time in (mins) excluding proc time.: 40


Critical care attestation.: 


If time is entered above; I have spent that time in minutes in the direct care 

of this critically ill patient, excluding procedure time.  Critical care time 

was spent on this patient in doing her initial evaluation, multiple 

reevaluations, ordering and interpretation of labs and imaging, discussion with 

the telemedicine neurologist, discussion with the patient's daughter.  This does

not include the time spent doing the intubation procedure.





Critical Care Time: 





40 minutes





ED Disposition


Clinical Impression: 


 Acute hypoxemic respiratory failure, Metabolic encephalopathy





CVA (cerebral vascular accident)


Qualifiers:


 CVA mechanism: unspecified Qualified Code(s): I63.9 - Cerebral infarction, 

unspecified





Metastatic lung cancer (metastasis from lung to other site)


Qualifiers:


 Laterality: right Qualified Code(s): C34.91 - Malignant neoplasm of unspecified

part of right bronchus or lung





Disposition: DC-09 OP ADMIT IP TO THIS HOSP


Is pt being admited?: Yes


Condition: Serious


Time of Disposition: 18:09

## 2021-04-16 NOTE — CAT SCAN REPORT
CT head/brain wo con



INDICATION / CLINICAL INFORMATION:

71 years Female; MAIN. 



TECHNIQUE: Routine CT head without contrast. All CT scans at this location are performed using CT dos
e reduction for ALARA by means of automated exposure control. 



COMPARISON: 

None.



FINDINGS:



BRAIN / INTRACRANIAL CONTENTS: There is an older infarct involving the right basal ganglia with encep
halomalacia and mild ex vacuo dilatation of the anterior right lateral ventricle. However, there is l
ess distinct decreased attenuation along the anterior right periventricular region measuring 2.7 cm A
P indicative of more subacute infarct at. There is relative preservation of the posterior right putam
en. There are chronic ischemic changes along the left posterior periventricular region.



There is otherwise mild cerebral white matter disease most consistent with microvascular angiopathy. 
There is a calcified lesion along the posterior left cerebellum measuring 2.3 cm transversely most co
nsistent with a meningioma. This finding results in mass effect upon the posterior left cerebellum wi
thout significant vasogenic edema. There is no clear CT evidence of acute intracranial hemorrhage. 



ORBITS: No significant abnormality of visualized orbits.

SINUSES / MASTOIDS: No significant abnormality in the visualized paranasal sinuses or mastoid air dillon
ls.



CRANIOCERVICAL JUNCTION: No significant abnormality.

ADDITIONAL FINDINGS: There is mild incidental hyperostosis frontalis interna. There are also multiple
 well-circumscribed nodules involving scalp which also appear to be incidental and may reflect sebace
ous cysts. 



IMPRESSION:

1. The findings are indicative of subacute infarct along the anterior right periventricular region as
 detailed above. There is a more chronic appearing infarct along the adjacent right basal ganglia.

2. The findings are most consistent with a 2.3 cm calcified meningioma along the posterior left cereb
ellum.



The study was specified as code stroke and called emergently to the ER at 12:37 PM [facility] standard t
mili.



Signer Name: Ty Leory MD 

Signed: 4/16/2021 1:58 PM

Workstation Name: [id number]

## 2021-04-16 NOTE — CAT SCAN REPORT
CT angio head



INDICATION / CLINICAL INFORMATION:

71 years Female; MAIN.



TECHNIQUE: Thin cut axial images obtained through the head during IV bolus contrast administration. S
agittal, coronal, and 3 plane MIP reconstructions performed by the technologist. NASCET type criteria
 used evaluate stenoses. Automated exposure control utilized for radiation reduction purposes.



COMPARISON: 

None available.



FINDINGS:



INTERNAL CAROTID ARTERIES: No hemodynamically significant narrowing appreciated. However, there is mi
ld narrowing in the anterior genu of the cavernous portion of the right internal carotid artery relat
ed to atherosclerotic disease. Overall, the atherosclerotic disease is greater on the right than the 
left.



The left internal carotid artery is dominant when compared with the right, related to hypoplastic A1 
segment on the right.



VERTEBROBASILAR SYSTEM: No significant narrowing appreciated.



DISTAL BRANCHES: Distal branches of the anterior, middle, and posterior cerebral arteries are fairly 
symmetric in appearance and number.



As noted above, the A1 segment on the right is hypoplastic.



Mild to moderate narrowing is seen in the distal M1 segment on the left. No signs of thrombus appreci
ated. Areas of mild narrowing are seen in the proximal posterior cerebral arteries bilaterally.



ANEURYSM: None identified.



ADDITIONAL FINDINGS: Old corpus striatal infarct suggested on the right.



Developmental venous anomaly seen in the inferior temporal gyrus on the left, somewhat anteriorly.



Presumed meningioma seen along the lateral aspect of the tentorium cerebelli on the left, projecting 
into the adjacent posterior fossa. There is mass effect on the left transverse sinus. The right trans
verse sinus and right internal jugular veins are asymmetrically prominent, compared with the left.



Subcutaneous, well-circumscribed, areas of soft tissue swelling seen on the left. Findings may be rel
ated to small sebaceous cysts. However, clinical correlation is recommended. No signs of underlying c
alvarial abnormality. Similar findings seen elsewhere along the calvarium, bilaterally.



Small, well-circumscribed parotid lesions seen peripherally on the left, superficially-pleomorphic ad
enoma might be considered.



IMPRESSION:

1. No signs of large vessel occlusion by thrombus on this CTA of the head.

2. Area of narrowing identified, as described above.

3. Subcutaneous nodules and left parotid lesion, as described above. In addition to the above-mention
ed diagnoses, metastatic disease has to be considered, given findings on this CTA of the neck, there 
is suggestion of diffuse lung parenchymal and subcutaneous nodules.



Signer Name: Kemal Escalera MD, III 

Signed: 4/16/2021 5:08 PM

Workstation Name: [id number]

## 2021-04-17 LAB
ALBUMIN SERPL-MCNC: 2.7 G/DL (ref 3.9–5)
ALT SERPL-CCNC: 12 UNITS/L (ref 7–56)
BASOPHILS # (AUTO): 0 K/MM3 (ref 0–0.1)
BASOPHILS NFR BLD AUTO: 0.1 % (ref 0–1.8)
BUN SERPL-MCNC: 13 MG/DL (ref 7–17)
BUN/CREAT SERPL: 22 %
CALCIUM SERPL-MCNC: 8.1 MG/DL (ref 8.4–10.2)
CRP SERPL-MCNC: 4.8 MG/DL (ref 0–1.3)
EOSINOPHIL # BLD AUTO: 0 K/MM3 (ref 0–0.4)
EOSINOPHIL NFR BLD AUTO: 0 % (ref 0–4.3)
HCT VFR BLD CALC: 38.2 % (ref 30.3–42.9)
HEMOLYSIS INDEX: 19
HGB BLD-MCNC: 12.1 GM/DL (ref 10.1–14.3)
LYMPHOCYTES # BLD AUTO: 0.8 K/MM3 (ref 1.2–5.4)
LYMPHOCYTES NFR BLD AUTO: 8.1 % (ref 13.4–35)
MCHC RBC AUTO-ENTMCNC: 32 % (ref 30–34)
MCV RBC AUTO: 83 FL (ref 79–97)
MONOCYTES # (AUTO): 0.7 K/MM3 (ref 0–0.8)
MONOCYTES % (AUTO): 7 % (ref 0–7.3)
PLATELET # BLD: 297 K/MM3 (ref 140–440)
RBC # BLD AUTO: 4.58 M/MM3 (ref 3.65–5.03)

## 2021-04-17 RX ADMIN — INSULIN HUMAN SCH UNITS: 100 INJECTION, SOLUTION PARENTERAL at 23:58

## 2021-04-17 RX ADMIN — FAMOTIDINE SCH MG: 10 INJECTION, SOLUTION INTRAVENOUS at 21:21

## 2021-04-17 RX ADMIN — ENOXAPARIN SODIUM SCH MG: 100 INJECTION SUBCUTANEOUS at 21:21

## 2021-04-17 RX ADMIN — Medication SCH ML: at 21:21

## 2021-04-17 RX ADMIN — FAMOTIDINE SCH MG: 10 INJECTION, SOLUTION INTRAVENOUS at 10:33

## 2021-04-17 RX ADMIN — DEXTROSE SCH MLS/HR: 10 SOLUTION INTRAVENOUS at 21:43

## 2021-04-17 RX ADMIN — Medication SCH ML: at 10:33

## 2021-04-17 RX ADMIN — DEXTROSE SCH MLS/HR: 10 SOLUTION INTRAVENOUS at 04:46

## 2021-04-17 NOTE — XRAY REPORT
CHEST 1 VIEW 



INDICATION / CLINICAL INFORMATION:

follow up respiratory failure.



COMPARISON: 

4/16/2021



FINDINGS:



SUPPORT DEVICES: Endotracheal tube, nasogastric tube

HEART / MEDIASTINUM: No significant abnormality. 

LUNGS / PLEURA: Numerous pulmonary nodules and right infrahilar mass unchanged No pneumothorax. 



ADDITIONAL FINDINGS: No significant additional findings.



IMPRESSION:

No change in the appearance of the chest from yesterday



Signer Name: Juan Mahoney MD FACR 

Signed: 4/17/2021 9:44 AM

Workstation Name: [id number]

## 2021-04-17 NOTE — PROGRESS NOTE
Assessment and Plan





This is a 71-year-old female who is currently visiting her family in [facility] with 

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 

Metastatic Lung Neoplasm brought to the ER by EMS after she was found 

unresponsive by family in the morning.





Acute hypoxemic respiratory failure 


metastatic lung cancer


Possible acute CVA


Metabolic encephalopathy


Hyponatremia hypoglycemia


Hypoglycemia


History of CVA with aphasia


Vascular dementia


Severe protein calorie malnutrition





-Continue to monitor in the critical care with frequent neuro checkups


-Patient currently intubated, critical care following


-Discussed with patient daughter today and requesting for DNR and hospice 

service


-We will continue current management and plan, will consult  for 

hospice placement


-Continue D5 10 for now to prevent hypoglycemia


-DVT and GI prophylaxis








The high probability of a clinically significant, sudden or life threatening 

deterioration of the [multi] system(s) required my full and direct attention, 

intervention and personal management. The aggregate critical care time was [35.]

 minutes. This time is in addition to time spent performing reported procedures 

but includes the following: 





[x] Data Review and interpretation 





[x] Patient assessment and monitoring of vital signs 





[x] Documentation 





[x] Medication orders and management











Subjective


Date of service: 04/17/21


Interval history: 





Patient seen and examined.  Medical records and medication list reviewed.  


Patient remains intubated


Discussed with daughter with clinical updates


Also discussed with Dr. MCCARTHY.


Patient requested for DNR and hospice service by daughter








Objective





- Exam


Narrative Exam: 








General appearance: Present: severe distress, cachectic





- EENT


Eyes: Present: miosis


ENT: hearing decreased





- Neck


Neck: Present: supple, normal ROM





- Respiratory


Respiratory effort: labored


Respiratory: bilateral: diminished, rhonchi





- Cardiovascular


Heart Sounds: Present: S1 & S2.  Absent: rub, click





- Extremities


Extremities: pulses symmetrical, No edema


Peripheral Pulses: within normal limits





- Abdominal


General gastrointestinal: Present: soft, non-tender, non-distended, normal bowel

 sounds


Female genitourinary: Present: normal





- Musculoskeletal


Musculoskeletal: generalized weakness





- Psychiatric


Psychiatric: no appropriate mood/affect, no intact judgment & insight, no memory

 intact





- Neurologic


Neurologic: no CNII-XII intact, focal deficits, no moves all extremities, no 

gait normal








- Constitutional


Vitals: 


                               Vital Signs - 12hr











  04/17/21 04/17/21 04/17/21





  00:30 00:40 00:50


 


Temperature   


 


Pulse Rate 88 85 85


 


Pulse Rate [   





From Monitor]   


 


Respiratory 21 17 19





Rate   


 


Blood Pressure 137/65 137/65 137/65


 


O2 Sat by Pulse 100 100 99





Oximetry   














  04/17/21 04/17/21 04/17/21





  01:00 01:10 01:20


 


Temperature   


 


Pulse Rate 88 87 84


 


Pulse Rate [   





From Monitor]   


 


Respiratory 19 19 17





Rate   


 


Blood Pressure 126/64 126/64 126/64


 


O2 Sat by Pulse 99 99 100





Oximetry   














  04/17/21 04/17/21 04/17/21





  01:30 01:40 01:50


 


Temperature   


 


Pulse Rate 91 H 88 89


 


Pulse Rate [   





From Monitor]   


 


Respiratory 21 16 19





Rate   


 


Blood Pressure 126/64 126/64 126/64


 


O2 Sat by Pulse 99 99 99





Oximetry   














  04/17/21 04/17/21 04/17/21





  02:00 02:10 02:20


 


Temperature   


 


Pulse Rate 87 85 86


 


Pulse Rate [   





From Monitor]   


 


Respiratory 21 15 20





Rate   


 


Blood Pressure 127/68 127/68 126/64


 


O2 Sat by Pulse 100 99 100





Oximetry   














  04/17/21 04/17/21 04/17/21





  02:23 02:30 02:40


 


Temperature   


 


Pulse Rate 88 87 87


 


Pulse Rate [   





From Monitor]   


 


Respiratory  16 16





Rate   


 


Blood Pressure 127/68 126/64 126/64


 


O2 Sat by Pulse 98 98 97





Oximetry   














  04/17/21 04/17/21 04/17/21





  02:50 03:00 03:10


 


Temperature   


 


Pulse Rate 92 H 90 85


 


Pulse Rate [   





From Monitor]   


 


Respiratory 20 20 15





Rate   


 


Blood Pressure 126/64 123/69 123/69


 


O2 Sat by Pulse 98 97 97





Oximetry   














  04/17/21 04/17/21 04/17/21





  03:20 03:30 03:40


 


Temperature   


 


Pulse Rate 94 H 93 H 91 H


 


Pulse Rate [   





From Monitor]   


 


Respiratory 20 21 18





Rate   


 


Blood Pressure 123/69 123/69 123/69


 


O2 Sat by Pulse 98 98 98





Oximetry   














  04/17/21 04/17/21 04/17/21





  03:50 04:00 04:10


 


Temperature  99.1 F 


 


Pulse Rate 87 82 90


 


Pulse Rate [  82 





From Monitor]   


 


Respiratory 20 16 18





Rate   


 


Blood Pressure 123/69 123/67 123/67


 


O2 Sat by Pulse 98 97 98





Oximetry   














  04/17/21 04/17/21 04/17/21





  04:20 04:30 04:40


 


Temperature   


 


Pulse Rate 88 87 87


 


Pulse Rate [   





From Monitor]   


 


Respiratory 19 17 20





Rate   


 


Blood Pressure 123/67 123/67 123/67


 


O2 Sat by Pulse 97 97 99





Oximetry   














  04/17/21 04/17/21 04/17/21





  04:50 05:00 05:10


 


Temperature   


 


Pulse Rate 80 83 84


 


Pulse Rate [   





From Monitor]   


 


Respiratory 15 19 18





Rate   


 


Blood Pressure 123/67 121/62 121/62


 


O2 Sat by Pulse 97 97 98





Oximetry   














  04/17/21 04/17/21 04/17/21





  05:20 05:30 05:40


 


Temperature   


 


Pulse Rate 83 87 85


 


Pulse Rate [   





From Monitor]   


 


Respiratory 19 18 18





Rate   


 


Blood Pressure 121/62 121/62 121/62


 


O2 Sat by Pulse 99 98 98





Oximetry   














  04/17/21 04/17/21 04/17/21





  05:50 06:00 06:10


 


Temperature   


 


Pulse Rate 88 89 90


 


Pulse Rate [   





From Monitor]   


 


Respiratory 19 19 20





Rate   


 


Blood Pressure 121/62 130/68 130/68


 


O2 Sat by Pulse 98  98





Oximetry   














  04/17/21 04/17/21 04/17/21





  06:12 06:20 06:30


 


Temperature   


 


Pulse Rate 88 88 120 H


 


Pulse Rate [   





From Monitor]   


 


Respiratory  21 21





Rate   


 


Blood Pressure 130/65 130/68 130/68


 


O2 Sat by Pulse 98 98 98





Oximetry   














  04/17/21 04/17/21 04/17/21





  06:40 06:50 07:00


 


Temperature   


 


Pulse Rate 117 H 122 H 120 H


 


Pulse Rate [   





From Monitor]   


 


Respiratory 20 21 20





Rate   


 


Blood Pressure 130/68 130/68 143/82


 


O2 Sat by Pulse 98 98 97





Oximetry   














  04/17/21 04/17/21 04/17/21





  07:10 07:20 07:30


 


Temperature   


 


Pulse Rate 118 H 97 H 98 H


 


Pulse Rate [   





From Monitor]   


 


Respiratory 19 20 22





Rate   


 


Blood Pressure 143/82 143/82 143/82


 


O2 Sat by Pulse 98 98 98





Oximetry   














  04/17/21 04/17/21 04/17/21





  07:40 07:50 08:00


 


Temperature   99.9 F H


 


Pulse Rate 82 84 89


 


Pulse Rate [   





From Monitor]   


 


Respiratory 17 19 14





Rate   


 


Blood Pressure 143/82 143/82 133/66


 


O2 Sat by Pulse 96 99 100





Oximetry   














  04/17/21 04/17/21 04/17/21





  08:10 08:20 08:30


 


Temperature   


 


Pulse Rate 87 82 82


 


Pulse Rate [   





From Monitor]   


 


Respiratory 19 18 16





Rate   


 


Blood Pressure 133/66 133/66 133/66


 


O2 Sat by Pulse 98 98 98





Oximetry   














  04/17/21 04/17/21 04/17/21





  08:40 08:50 09:00


 


Temperature   


 


Pulse Rate 84 89 85


 


Pulse Rate [   91 H





From Monitor]   


 


Respiratory 16 20 18





Rate   


 


Blood Pressure 133/66 133/66 139/66


 


O2 Sat by Pulse 98 98 98





Oximetry   














  04/17/21





  09:10


 


Temperature 


 


Pulse Rate 94 H


 


Pulse Rate [ 





From Monitor] 


 


Respiratory 23





Rate 


 


Blood Pressure 139/66


 


O2 Sat by Pulse 99





Oximetry 














- Labs


CBC & Chem 7: 


                                 04/17/21 04:19





                                 04/17/21 04:19


Labs: 


                              Abnormal lab results











  04/16/21 04/16/21 04/16/21 Range/Units





  14:00 14:06 14:06 


 


RBC   5.27 H   (3.65-5.03)  M/mm3


 


Hgb   14.7 H   (10.1-14.3)  gm/dl


 


Hct   43.7 H   (30.3-42.9)  %


 


MCH     (28-32)  pg


 


RDW   15.8 H   (13.2-15.2)  %


 


Lymph % (Auto)   6.2 L   (13.4-35.0)  %


 


Lymph # (Auto)   0.7 L   (1.2-5.4)  K/mm3


 


Seg Neutrophils %   89.2 H   (40.0-70.0)  %


 


Seg Neutrophils #   9.5 H   (1.8-7.7)  K/mm3


 


Thrombin Time    19.8 H  (15.1-19.6)  Sec.


 


ABG pH     (7.320-7.450)  


 


POC ABG pCO2     (32.0-48.0)  mmHg


 


POC ABG pO2     ()  mmHg


 


ABG Oxyhemoglobin     (94-98)  


 


ABG Glucose     (65-95)  mg/dL


 


Sodium     (137-145)  mmol/L


 


Chloride     ()  mmol/L


 


Glucose     ()  mg/dL


 


POC Glucose  150 H    ()  mg/dL


 


Calcium     (8.4-10.2)  mg/dL


 


Total Creatine Kinase     ()  units/L


 


Total Protein     (6.3-8.2)  g/dL


 


Albumin     (3.9-5)  g/dL


 


Arterial Blood Glucose     (65-95)  mg/dL


 


Arterial Blood Ionized Calcium     (4.6-5.3)  mg/dL














  04/16/21 04/16/21 04/17/21 Range/Units





  14:06 14:42 04:07 


 


RBC     (3.65-5.03)  M/mm3


 


Hgb     (10.1-14.3)  gm/dl


 


Hct     (30.3-42.9)  %


 


MCH     (28-32)  pg


 


RDW     (13.2-15.2)  %


 


Lymph % (Auto)     (13.4-35.0)  %


 


Lymph # (Auto)     (1.2-5.4)  K/mm3


 


Seg Neutrophils %     (40.0-70.0)  %


 


Seg Neutrophils #     (1.8-7.7)  K/mm3


 


Thrombin Time     (15.1-19.6)  Sec.


 


ABG pH     (7.320-7.450)  


 


POC ABG pCO2     (32.0-48.0)  mmHg


 


POC ABG pO2   71.3 L   ()  mmHg


 


ABG Oxyhemoglobin   92.6 L   (94-98)  


 


ABG Glucose   118 H   (65-95)  mg/dL


 


Sodium  129 L    (137-145)  mmol/L


 


Chloride  93 L    ()  mmol/L


 


Glucose  155 H    ()  mg/dL


 


POC Glucose    50 L  ()  mg/dL


 


Calcium     (8.4-10.2)  mg/dL


 


Total Creatine Kinase  185 H    ()  units/L


 


Total Protein  10.4 H    (6.3-8.2)  g/dL


 


Albumin  3.7 L    (3.9-5)  g/dL


 


Arterial Blood Glucose   118 H   (65-95)  mg/dL


 


Arterial Blood Ionized Calcium   4.4 L   (4.6-5.3)  mg/dL














  04/17/21 04/17/21 04/17/21 Range/Units





  04:19 04:19 05:28 


 


RBC     (3.65-5.03)  M/mm3


 


Hgb     (10.1-14.3)  gm/dl


 


Hct     (30.3-42.9)  %


 


MCH  26 L    (28-32)  pg


 


RDW  15.9 H    (13.2-15.2)  %


 


Lymph % (Auto)  8.1 L    (13.4-35.0)  %


 


Lymph # (Auto)  0.8 L    (1.2-5.4)  K/mm3


 


Seg Neutrophils %  84.8 H    (40.0-70.0)  %


 


Seg Neutrophils #  8.3 H    (1.8-7.7)  K/mm3


 


Thrombin Time     (15.1-19.6)  Sec.


 


ABG pH     (7.320-7.450)  


 


POC ABG pCO2     (32.0-48.0)  mmHg


 


POC ABG pO2     ()  mmHg


 


ABG Oxyhemoglobin     (94-98)  


 


ABG Glucose     (65-95)  mg/dL


 


Sodium     (137-145)  mmol/L


 


Chloride     ()  mmol/L


 


Glucose   53 L   ()  mg/dL


 


POC Glucose    156 H  ()  mg/dL


 


Calcium   8.1 L   (8.4-10.2)  mg/dL


 


Total Creatine Kinase     ()  units/L


 


Total Protein     (6.3-8.2)  g/dL


 


Albumin   2.7 L   (3.9-5)  g/dL


 


Arterial Blood Glucose     (65-95)  mg/dL


 


Arterial Blood Ionized Calcium     (4.6-5.3)  mg/dL














  04/17/21 Range/Units





  06:20 


 


RBC   (3.65-5.03)  M/mm3


 


Hgb   (10.1-14.3)  gm/dl


 


Hct   (30.3-42.9)  %


 


MCH   (28-32)  pg


 


RDW   (13.2-15.2)  %


 


Lymph % (Auto)   (13.4-35.0)  %


 


Lymph # (Auto)   (1.2-5.4)  K/mm3


 


Seg Neutrophils %   (40.0-70.0)  %


 


Seg Neutrophils #   (1.8-7.7)  K/mm3


 


Thrombin Time   (15.1-19.6)  Sec.


 


ABG pH  7.486 H  (7.320-7.450)  


 


POC ABG pCO2  31.5 L  (32.0-48.0)  mmHg


 


POC ABG pO2   ()  mmHg


 


ABG Oxyhemoglobin   (94-98)  


 


ABG Glucose  58 L  (65-95)  mg/dL


 


Sodium   (137-145)  mmol/L


 


Chloride   ()  mmol/L


 


Glucose   ()  mg/dL


 


POC Glucose   ()  mg/dL


 


Calcium   (8.4-10.2)  mg/dL


 


Total Creatine Kinase   ()  units/L


 


Total Protein   (6.3-8.2)  g/dL


 


Albumin   (3.9-5)  g/dL


 


Arterial Blood Glucose  58 L  (65-95)  mg/dL


 


Arterial Blood Ionized Calcium   (4.6-5.3)  mg/dL














HEART Score





- HEART Score


Troponin: 


                                        











Troponin T  0.010 ng/mL (0.00-0.029)   04/16/21  14:06

## 2021-04-17 NOTE — PROGRESS NOTE
Assessment and Plan








Acute hypoxemic respiratory failure, on mechanical ventilatory support.


Acute encephalopathy, possibly on chronic.


Acute cerebrovascular accident, subacute.


Metastatic pulmonary nodules, possibly lung cancer.


Hyponatremia.


Elevated serum creatine kinase





(Daughter called and stated that her mother would want to be a DNR after she 

discussed at length with the rest of the family also)





- COVID PCR positive


- get inflammatory markers and trend


- will get ID input re: Remdesivir ./ other adjuctive COVID specific therapies


- continue to wean supplemental oxygen for target O2 sat's > 92% acutely


- VAP bundle addressed


- continue lung protective strategies


- continue bronchodilators with pulmonary hygiene per RT


- wean per pulmonary driven protocols otherwise


- continue Daily SAT and SBT assessment as tolerated 


- continue accuchecks with glycemic control per SSI (While critically ill target

blood glucose of 140-180 mg/dL; avoid hypoglycemia)


- sedation prn for target RASS 0 to -1


- avoid nephrotoxins, renally dose all medications


- continue to avoid benzodiazepine's, reduce the possibility of delirium


- AB's per ID rec's 


- prn analgesia per CPOT score


- Maintenance of sleep-wake cycle, avoid delirium


- enteral nutritional support at goal rate as tolerated


- G.I. & VTE prophylaxis


- PT/OT/ROM exercises


- continue mobility protocols for pressure ulcer prophylaxis


- Monitor hemodynamics closely


- continue other care per attending / other consultants


- discharge planning ongoing concurrently





COVID SPECIFIC INTERVENTIONS


- Remdesivir as per ID/Pulmonary developed protocols


- systemic steroids for severe COVID-19 infection empirically


- follow repeat COVID tests results


- zinc and vitamin C supplementation


- Monitor inflammatory markers per facility protocol - ferritin, Ddimer, CRP


- therapeutic anticoagulation per system Protocol based on d-dimer and clinical 

considerations


- Continue contact and airborne isolation 





.... Re-evaluate in am & prn





CONDITION: CRITICAL


PROGNOSIS: GUARDED


CODE STATUS: FULL CODE





The high probability of a clinically significant, sudden or life-threatening 

deterioration of the [respiratory, cardiovascular & neurologic] system(s) 

required my full and direct attention, intervention and personal management. The

aggregate critical care time was [34] minutes without overlap. Time includes 

spent on;





[x] Data Review and interpretation 





[x] Patient assessment and monitoring of vital signs 





[x] Documentation 





[x] Medication orders and management





Subjective


Date of service: 04/17/21


Interval history: 





Patient is seen today for: 





Seen and examined at bedside; 24hour events reviewed; nursing and respiratory 

care staff consulted; no adverse overnight events reported to me; resting 

peacefully in bed; remains on MVS; a little more responsive; no emesis or overt 

aspiration














Objective


                               Vital Signs - 12hr











  04/17/21 04/17/21 04/17/21





  03:00 03:10 03:20


 


Temperature   


 


Pulse Rate 90 85 94 H


 


Pulse Rate [   





From Monitor]   


 


Respiratory 20 15 20





Rate   


 


Blood Pressure 123/69 123/69 123/69


 


O2 Sat by Pulse 97 97 98





Oximetry   














  04/17/21 04/17/21 04/17/21





  03:30 03:40 03:50


 


Temperature   


 


Pulse Rate 93 H 91 H 87


 


Pulse Rate [   





From Monitor]   


 


Respiratory 21 18 20





Rate   


 


Blood Pressure 123/69 123/69 123/69


 


O2 Sat by Pulse 98 98 98





Oximetry   














  04/17/21 04/17/21 04/17/21





  04:00 04:10 04:20


 


Temperature 99.1 F  


 


Pulse Rate 82 90 88


 


Pulse Rate [ 82  





From Monitor]   


 


Respiratory 16 18 19





Rate   


 


Blood Pressure 123/67 123/67 123/67


 


O2 Sat by Pulse 97 98 97





Oximetry   














  04/17/21 04/17/21 04/17/21





  04:30 04:40 04:50


 


Temperature   


 


Pulse Rate 87 87 80


 


Pulse Rate [   





From Monitor]   


 


Respiratory 17 20 15





Rate   


 


Blood Pressure 123/67 123/67 123/67


 


O2 Sat by Pulse 97 99 97





Oximetry   














  04/17/21 04/17/21 04/17/21





  05:00 05:10 05:20


 


Temperature   


 


Pulse Rate 83 84 83


 


Pulse Rate [   





From Monitor]   


 


Respiratory 19 18 19





Rate   


 


Blood Pressure 121/62 121/62 121/62


 


O2 Sat by Pulse 97 98 99





Oximetry   














  04/17/21 04/17/21 04/17/21





  05:30 05:40 05:50


 


Temperature   


 


Pulse Rate 87 85 88


 


Pulse Rate [   





From Monitor]   


 


Respiratory 18 18 19





Rate   


 


Blood Pressure 121/62 121/62 121/62


 


O2 Sat by Pulse 98 98 98





Oximetry   














  04/17/21 04/17/21 04/17/21





  06:00 06:10 06:12


 


Temperature   


 


Pulse Rate 89 90 88


 


Pulse Rate [   





From Monitor]   


 


Respiratory 19 20 





Rate   


 


Blood Pressure 130/68 130/68 130/65


 


O2 Sat by Pulse  98 98





Oximetry   














  04/17/21 04/17/21 04/17/21





  06:20 06:30 06:40


 


Temperature   


 


Pulse Rate 88 120 H 117 H


 


Pulse Rate [   





From Monitor]   


 


Respiratory 21 21 20





Rate   


 


Blood Pressure 130/68 130/68 130/68


 


O2 Sat by Pulse 98 98 98





Oximetry   














  04/17/21 04/17/21 04/17/21





  06:50 07:00 07:10


 


Temperature   


 


Pulse Rate 122 H 120 H 118 H


 


Pulse Rate [   





From Monitor]   


 


Respiratory 21 20 19





Rate   


 


Blood Pressure 130/68 143/82 143/82


 


O2 Sat by Pulse 98 97 98





Oximetry   














  04/17/21 04/17/21 04/17/21





  07:20 07:30 07:40


 


Temperature   


 


Pulse Rate 97 H 98 H 82


 


Pulse Rate [   





From Monitor]   


 


Respiratory 20 22 17





Rate   


 


Blood Pressure 143/82 143/82 143/82


 


O2 Sat by Pulse 98 98 96





Oximetry   














  04/17/21 04/17/21 04/17/21





  07:50 08:00 08:10


 


Temperature  99.9 F H 


 


Pulse Rate 84 89 87


 


Pulse Rate [   





From Monitor]   


 


Respiratory 19 14 19





Rate   


 


Blood Pressure 143/82 133/66 133/66


 


O2 Sat by Pulse 99 100 98





Oximetry   














  04/17/21 04/17/21 04/17/21





  08:20 08:30 08:40


 


Temperature   


 


Pulse Rate 82 82 84


 


Pulse Rate [   





From Monitor]   


 


Respiratory 18 16 16





Rate   


 


Blood Pressure 133/66 133/66 133/66


 


O2 Sat by Pulse 98 98 98





Oximetry   














  04/17/21 04/17/21 04/17/21





  08:50 09:00 09:10


 


Temperature   


 


Pulse Rate 89 85 94 H


 


Pulse Rate [  91 H 





From Monitor]   


 


Respiratory 20 18 23





Rate   


 


Blood Pressure 133/66 139/66 139/66


 


O2 Sat by Pulse 98 98 99





Oximetry   














  04/17/21 04/17/21 04/17/21





  09:20 09:30 09:40


 


Temperature   


 


Pulse Rate 90 85 91 H


 


Pulse Rate [   





From Monitor]   


 


Respiratory 21 20 18





Rate   


 


Blood Pressure 139/66 139/66 139/66


 


O2 Sat by Pulse 99 99 99





Oximetry   














  04/17/21 04/17/21 04/17/21





  09:50 10:00 10:10


 


Temperature   


 


Pulse Rate 80 91 H 87


 


Pulse Rate [   





From Monitor]   


 


Respiratory 16 19 18





Rate   


 


Blood Pressure 139/66 149/74 149/74


 


O2 Sat by Pulse 95 98 98





Oximetry   














  04/17/21 04/17/21 04/17/21





  10:20 10:30 10:40


 


Temperature   


 


Pulse Rate 92 H 89 80


 


Pulse Rate [   





From Monitor]   


 


Respiratory 19 20 16





Rate   


 


Blood Pressure 149/74 149/74 149/74


 


O2 Sat by Pulse 98 98 97





Oximetry   














  04/17/21 04/17/21 04/17/21





  10:50 11:00 11:10


 


Temperature   


 


Pulse Rate 80 79 79


 


Pulse Rate [   





From Monitor]   


 


Respiratory 17 16 16





Rate   


 


Blood Pressure 149/74 123/55 123/55


 


O2 Sat by Pulse 99 96 98





Oximetry   














  04/17/21 04/17/21 04/17/21





  11:20 11:30 11:40


 


Temperature   


 


Pulse Rate 80 87 80


 


Pulse Rate [   





From Monitor]   


 


Respiratory 15 19 18





Rate   


 


Blood Pressure 123/55 123/55 123/55


 


O2 Sat by Pulse 96 97 98





Oximetry   














  04/17/21 04/17/21 04/17/21





  11:50 12:00 12:05


 


Temperature  100 F H 


 


Pulse Rate 81 82 82


 


Pulse Rate [   





From Monitor]   


 


Respiratory 16 19 





Rate   


 


Blood Pressure 123/55 130/58 123/55


 


O2 Sat by Pulse 98 98 98





Oximetry   














  04/17/21 04/17/21 04/17/21





  12:10 12:20 12:30


 


Temperature   


 


Pulse Rate 83 80 84


 


Pulse Rate [   





From Monitor]   


 


Respiratory 17 17 17





Rate   


 


Blood Pressure 130/58 130/58 130/58


 


O2 Sat by Pulse 98 97 99





Oximetry   














  04/17/21 04/17/21 04/17/21





  12:40 12:50 13:00


 


Temperature   


 


Pulse Rate 87 81 86


 


Pulse Rate [   84





From Monitor]   


 


Respiratory 17 16 20





Rate   


 


Blood Pressure 130/58 130/58 135/63


 


O2 Sat by Pulse 99 98 97





Oximetry   














  04/17/21 04/17/21 04/17/21





  13:10 13:20 13:30


 


Temperature   


 


Pulse Rate 82 82 83


 


Pulse Rate [   





From Monitor]   


 


Respiratory 16 17 18





Rate   


 


Blood Pressure 135/63 135/63 130/58


 


O2 Sat by Pulse 99 96 95





Oximetry   














  04/17/21 04/17/21 04/17/21





  13:40 13:50 14:00


 


Temperature   


 


Pulse Rate 88 91 H 85


 


Pulse Rate [   





From Monitor]   


 


Respiratory 20 17 18





Rate   


 


Blood Pressure 130/58 130/58 135/63


 


O2 Sat by Pulse 98 99 98





Oximetry   














  04/17/21 04/17/21 04/17/21





  14:10 14:20 14:30


 


Temperature   


 


Pulse Rate 93 H 85 81


 


Pulse Rate [   





From Monitor]   


 


Respiratory 19 16 18





Rate   


 


Blood Pressure 136/61 136/61 136/61


 


O2 Sat by Pulse 99 98 98





Oximetry   














  04/17/21





  14:40


 


Temperature 


 


Pulse Rate 86


 


Pulse Rate [ 





From Monitor] 


 


Respiratory 18





Rate 


 


Blood Pressure 136/61


 


O2 Sat by Pulse 96





Oximetry 











Constitutional: other (elderly female with mildly increased respiratory effort 

at rest on MVS)


Eyes: non-icteric


ENT: oropharynx moist, other (ETT 23 cm RUBEN)


Neck: supple, no JVD


Effort: mildly labored


Ascultation: Bilateral: diminished breath sounds, rhonchi


Percussion: Bilateral: not dull


Cardiovascular: regular rate and rhythm


Gastrointestinal: normoactive bowel sounds, soft, non-tender, non-distended


Integumentary: normal


Extremities: no cyanosis, no edema, pulses normal, no ischemia or petechiae


Neurologic: pupils equal and round, unable to assess


Psychiatric: other (unable to assess re: AMS)


CBC and BMP: 


                                 04/17/21 04:19





                                 04/17/21 04:19


ABG, PT/INR, D-dimer: 


ABG











ABG pH  7.486  (7.320-7.450)  H  04/17/21  06:20    


 


POC ABG pCO2  31.5 mmHg (32.0-48.0)  L  04/17/21  06:20    


 


POC ABG pO2  90.9 mmHg ()   04/17/21  06:20    


 


POC ABG HCO3  23.3   04/17/21  06:20    


 


ABG O2 Saturation  97.7  (0-100)   04/17/21  06:20    





PT/INR, D-dimer











PT  12.8 Sec. (12.2-14.9)   04/16/21  14:06    


 


INR  0.98  (0.87-1.13)   04/16/21  14:06    








Abnormal lab findings: 


                                  Abnormal Labs











  04/16/21 04/16/21 04/16/21





  14:00 14:06 14:06


 


RBC   5.27 H 


 


Hgb   14.7 H 


 


Hct   43.7 H 


 


MCH   


 


RDW   15.8 H 


 


Lymph % (Auto)   6.2 L 


 


Lymph # (Auto)   0.7 L 


 


Seg Neutrophils %   89.2 H 


 


Seg Neutrophils #   9.5 H 


 


Thrombin Time    19.8 H


 


ABG pH   


 


POC ABG pCO2   


 


POC ABG pO2   


 


ABG Oxyhemoglobin   


 


ABG Glucose   


 


Sodium   


 


Chloride   


 


Glucose   


 


POC Glucose  150 H  


 


Calcium   


 


Total Creatine Kinase   


 


Total Protein   


 


Albumin   


 


Arterial Blood Glucose   


 


Arterial Blood Ionized Calcium   


 


Coronavirus (PCR)   














  04/16/21 04/16/21 04/17/21





  14:06 14:42 04:07


 


RBC   


 


Hgb   


 


Hct   


 


MCH   


 


RDW   


 


Lymph % (Auto)   


 


Lymph # (Auto)   


 


Seg Neutrophils %   


 


Seg Neutrophils #   


 


Thrombin Time   


 


ABG pH   


 


POC ABG pCO2   


 


POC ABG pO2   71.3 L 


 


ABG Oxyhemoglobin   92.6 L 


 


ABG Glucose   118 H 


 


Sodium  129 L  


 


Chloride  93 L  


 


Glucose  155 H  


 


POC Glucose    50 L


 


Calcium   


 


Total Creatine Kinase  185 H  


 


Total Protein  10.4 H  


 


Albumin  3.7 L  


 


Arterial Blood Glucose   118 H 


 


Arterial Blood Ionized Calcium   4.4 L 


 


Coronavirus (PCR)   














  04/17/21 04/17/21 04/17/21





  04:19 04:19 05:28


 


RBC   


 


Hgb   


 


Hct   


 


MCH  26 L  


 


RDW  15.9 H  


 


Lymph % (Auto)  8.1 L  


 


Lymph # (Auto)  0.8 L  


 


Seg Neutrophils %  84.8 H  


 


Seg Neutrophils #  8.3 H  


 


Thrombin Time   


 


ABG pH   


 


POC ABG pCO2   


 


POC ABG pO2   


 


ABG Oxyhemoglobin   


 


ABG Glucose   


 


Sodium   


 


Chloride   


 


Glucose   53 L 


 


POC Glucose    156 H


 


Calcium   8.1 L 


 


Total Creatine Kinase   


 


Total Protein   


 


Albumin   2.7 L 


 


Arterial Blood Glucose   


 


Arterial Blood Ionized Calcium   


 


Coronavirus (PCR)   














  04/17/21 04/17/21 04/17/21





  06:20 11:43 Unknown


 


RBC   


 


Hgb   


 


Hct   


 


MCH   


 


RDW   


 


Lymph % (Auto)   


 


Lymph # (Auto)   


 


Seg Neutrophils %   


 


Seg Neutrophils #   


 


Thrombin Time   


 


ABG pH  7.486 H  


 


POC ABG pCO2  31.5 L  


 


POC ABG pO2   


 


ABG Oxyhemoglobin   


 


ABG Glucose  58 L  


 


Sodium   


 


Chloride   


 


Glucose   


 


POC Glucose   180 H 


 


Calcium   


 


Total Creatine Kinase   


 


Total Protein   


 


Albumin   


 


Arterial Blood Glucose  58 L  


 


Arterial Blood Ionized Calcium   


 


Coronavirus (PCR)    Positive A











Chest x-ray: image reviewed (ETT in better position; multiple metastatic 

nodules)


Allied health notes reviewed: nursing

## 2021-04-17 NOTE — EVENT NOTE
Date: 04/17/21





Spoke with patient's daughter and she is requesting DNR


Discussed with Dr MCCARTHY


Will consult hospice as per daughter's wish

## 2021-04-18 RX ADMIN — FAMOTIDINE SCH MG: 10 INJECTION, SOLUTION INTRAVENOUS at 09:23

## 2021-04-18 RX ADMIN — Medication SCH ML: at 09:22

## 2021-04-18 RX ADMIN — FAMOTIDINE SCH MG: 10 INJECTION, SOLUTION INTRAVENOUS at 22:11

## 2021-04-18 RX ADMIN — INSULIN HUMAN SCH UNITS: 100 INJECTION, SOLUTION PARENTERAL at 06:27

## 2021-04-18 RX ADMIN — DEXTROSE SCH MLS/HR: 10 SOLUTION INTRAVENOUS at 16:56

## 2021-04-18 RX ADMIN — Medication SCH ML: at 22:11

## 2021-04-18 RX ADMIN — INSULIN HUMAN SCH UNITS: 100 INJECTION, SOLUTION PARENTERAL at 17:23

## 2021-04-18 RX ADMIN — AMPICILLIN AND SULBACTAM SCH MLS/HR: 1; .5 INJECTION, POWDER, FOR SOLUTION INTRAMUSCULAR; INTRAVENOUS at 18:39

## 2021-04-18 RX ADMIN — ENOXAPARIN SODIUM SCH MG: 100 INJECTION SUBCUTANEOUS at 22:11

## 2021-04-18 RX ADMIN — INSULIN HUMAN SCH UNITS: 100 INJECTION, SOLUTION PARENTERAL at 12:52

## 2021-04-18 RX ADMIN — ACETAMINOPHEN PRN MG: 325 SUSPENSION ORAL at 17:22

## 2021-04-18 RX ADMIN — ACETAMINOPHEN PRN MG: 325 SUSPENSION ORAL at 08:23

## 2021-04-18 NOTE — PROGRESS NOTE
Assessment and Plan








Acute hypoxemic respiratory failure, on mechanical ventilatory support.


Acute encephalopathy, possibly on chronic.


Acute cerebrovascular accident, subacute.


Metastatic pulmonary nodules, possibly lung cancer.


Hyponatremia.


Elevated serum creatine kinase





(Daughter called and stated that her mother would want to be a DNR after she 

discussed at length with the rest of the family also)





- tentatively for hospice care


- continue care as below for now;





- continue to wean supplemental oxygen for target O2 sat's > 92% acutely


- VAP bundle addressed


- continue lung protective strategies


- continue bronchodilators with pulmonary hygiene per RT


- wean per pulmonary driven protocols otherwise


- continue Daily SAT and SBT assessment as tolerated 


- continue accuchecks with glycemic control per SSI (While critically ill target

blood glucose of 140-180 mg/dL; avoid hypoglycemia)


- sedation prn for target RASS 0 to -1


- avoid nephrotoxins, renally dose all medications


- continue to avoid benzodiazepine's, reduce the possibility of delirium


- AB's per ID rec's 


- prn analgesia per CPOT score


- Maintenance of sleep-wake cycle, avoid delirium


- enteral nutritional support at goal rate as tolerated


- G.I. & VTE prophylaxis


- PT/OT/ROM exercises


- continue mobility protocols for pressure ulcer prophylaxis


- Monitor hemodynamics closely


- continue other care per attending / other consultants


- discharge planning ongoing concurrently





COVID SPECIFIC INTERVENTIONS


- Remdesivir as per ID/Pulmonary developed protocols


- systemic steroids for severe COVID-19 infection empirically


- follow repeat COVID tests results


- zinc and vitamin C supplementation


- Monitor inflammatory markers per facility protocol - ferritin, Ddimer, CRP


- therapeutic anticoagulation per system Protocol based on d-dimer and clinical 

considerations


- Continue contact and airborne isolation 





.... Re-evaluate in am & prn





CONDITION: CRITICAL


PROGNOSIS: GUARDED


CODE STATUS: FULL CODE





The high probability of a clinically significant, sudden or life-threatening 

deterioration of the [respiratory, cardiovascular & neurologic] system(s) 

required my full and direct attention, intervention and personal management. The

aggregate critical care time was [32] minutes without overlap. Time includes 

spent on;





[x] Data Review and interpretation 





[x] Patient assessment and monitoring of vital signs 





[x] Documentation 





[x] Medication orders and management











Subjective


Date of service: 04/18/21


Principal diagnosis: Ac. hypoxemic resp failure; Ac. encephalopathy; Acute CVA; 

Metast. lung CA


Interval history: 





Patient is seen today for: Acute hypoxemic respiratory failure; Acute 

encephalopathy; Acute cerebrovascular accident; Metastatic pulmonary nodules 

(lung cancer per daughter)





Seen and examined at bedside; 24hour events reviewed; nursing and respiratory 

care staff consulted; no adverse overnight events reported to me; resting 

peacefully in bed; remains on MVS; AMS is persistent; no emesis ort overt 

aspiration











Objective


                               Vital Signs - 12hr











  04/18/21 04/18/21 04/18/21





  01:30 02:00 02:30


 


Temperature   


 


Pulse Rate 95 H 93 H 94 H


 


Pulse Rate [   





From Monitor]   


 


Respiratory 20 19 16





Rate   


 


Blood Pressure 124/63 116/62 116/62


 


O2 Sat by Pulse 97  99





Oximetry   














  04/18/21 04/18/21 04/18/21





  03:00 03:30 03:40


 


Temperature   


 


Pulse Rate 96 H 95 H 95 H


 


Pulse Rate [   





From Monitor]   


 


Respiratory 20 20 





Rate   


 


Blood Pressure 119/65 119/65 119/65


 


O2 Sat by Pulse 97 98 98





Oximetry   














  04/18/21 04/18/21 04/18/21





  04:00 04:30 05:00


 


Temperature 100.4 F H  


 


Pulse Rate 95 H 100 H 97 H


 


Pulse Rate [   





From Monitor]   


 


Respiratory 19 22 20





Rate   


 


Blood Pressure 118/63 118/63 120/62


 


O2 Sat by Pulse 97 98 98





Oximetry   














  04/18/21 04/18/21 04/18/21





  05:30 06:00 06:30


 


Temperature   


 


Pulse Rate 98 H 94 H 95 H


 


Pulse Rate [   





From Monitor]   


 


Respiratory 21 19 20





Rate   


 


Blood Pressure 120/62 118/63 118/63


 


O2 Sat by Pulse 99  99





Oximetry   














  04/18/21 04/18/21 04/18/21





  07:00 07:30 07:48


 


Temperature   102.3 F H


 


Pulse Rate 97 H 98 H 


 


Pulse Rate [   





From Monitor]   


 


Respiratory 18 20 





Rate   


 


Blood Pressure 124/63 124/63 


 


O2 Sat by Pulse 98 98 





Oximetry   














  04/18/21 04/18/21 04/18/21





  08:00 08:11 08:30


 


Temperature   


 


Pulse Rate 104 H 102 H 105 H


 


Pulse Rate [ 97 H  





From Monitor]   


 


Respiratory 17  22





Rate   


 


Blood Pressure 115/64 115/64 115/64


 


O2 Sat by Pulse 96 97 98





Oximetry   














  04/18/21 04/18/21 04/18/21





  09:00 09:30 10:00


 


Temperature   


 


Pulse Rate 104 H 99 H 96 H


 


Pulse Rate [   





From Monitor]   


 


Respiratory 21 19 19





Rate   


 


Blood Pressure 128/67 128/67 113/64


 


O2 Sat by Pulse 97 97 98





Oximetry   














  04/18/21 04/18/21 04/18/21





  10:30 11:00 12:28


 


Temperature   99.8 F H


 


Pulse Rate 94 H 90 


 


Pulse Rate [   





From Monitor]   


 


Respiratory 19 15 





Rate   


 


Blood Pressure 113/64 101/60 


 


O2 Sat by Pulse 98 97 





Oximetry   














  04/18/21





  12:32


 


Temperature 


 


Pulse Rate 93 H


 


Pulse Rate [ 





From Monitor] 


 


Respiratory 





Rate 


 


Blood Pressure 105/63


 


O2 Sat by Pulse 99





Oximetry 











Constitutional: other (elderly female with mildly increased respiratory effort 

at rest on MVS)


Eyes: non-icteric


ENT: oropharynx moist, other (ETT 23 cm RUBEN)


Neck: supple, no JVD


Effort: mildly labored


Ascultation: Bilateral: diminished breath sounds, rhonchi


Percussion: Bilateral: not dull


Cardiovascular: regular rate and rhythm


Gastrointestinal: normoactive bowel sounds, soft, non-tender, non-distended


Integumentary: normal


Extremities: no cyanosis, no edema, pulses normal, no ischemia or petechiae


Neurologic: pupils equal and round, unable to assess


Psychiatric: other (unable to assess re: AMS)


CBC and BMP: 


                                 04/17/21 04:19





                                 04/17/21 04:19


ABG, PT/INR, D-dimer: 


ABG











ABG pH  7.525  (7.320-7.450)  H  04/18/21  03:58    


 


POC ABG pCO2  34.0 mmHg (32.0-48.0)   04/18/21  03:58    


 


POC ABG pO2  84.9 mmHg ()   04/18/21  03:58    


 


POC ABG HCO3  27.5   04/18/21  03:58    


 


ABG O2 Saturation  97.1  (0-100)   04/18/21  03:58    





PT/INR, D-dimer











PT  12.8 Sec. (12.2-14.9)   04/16/21  14:06    


 


INR  0.98  (0.87-1.13)   04/16/21  14:06    








Abnormal lab findings: 


                                  Abnormal Labs











  04/16/21 04/16/21 04/16/21





  14:00 14:06 14:06


 


RBC   5.27 H 


 


Hgb   14.7 H 


 


Hct   43.7 H 


 


MCH   


 


RDW   15.8 H 


 


Lymph % (Auto)   6.2 L 


 


Lymph # (Auto)   0.7 L 


 


Seg Neutrophils %   89.2 H 


 


Seg Neutrophils #   9.5 H 


 


Thrombin Time    19.8 H


 


ABG pH   


 


POC ABG pCO2   


 


POC ABG pO2   


 


ABG Oxyhemoglobin   


 


ABG Sodium   


 


ABG Glucose   


 


Sodium   


 


Chloride   


 


Glucose   


 


POC Glucose  150 H  


 


Calcium   


 


Phosphorus   


 


Total Creatine Kinase   


 


C-Reactive Protein   


 


Total Protein   


 


Albumin   


 


Arterial Blood Glucose   


 


Arterial Blood Ionized Calcium   


 


Coronavirus (PCR)   














  04/16/21 04/16/21 04/17/21





  14:06 14:42 04:07


 


RBC   


 


Hgb   


 


Hct   


 


MCH   


 


RDW   


 


Lymph % (Auto)   


 


Lymph # (Auto)   


 


Seg Neutrophils %   


 


Seg Neutrophils #   


 


Thrombin Time   


 


ABG pH   


 


POC ABG pCO2   


 


POC ABG pO2   71.3 L 


 


ABG Oxyhemoglobin   92.6 L 


 


ABG Sodium   


 


ABG Glucose   118 H 


 


Sodium  129 L  


 


Chloride  93 L  


 


Glucose  155 H  


 


POC Glucose    50 L


 


Calcium   


 


Phosphorus   


 


Total Creatine Kinase  185 H  


 


C-Reactive Protein   


 


Total Protein  10.4 H  


 


Albumin  3.7 L  


 


Arterial Blood Glucose   118 H 


 


Arterial Blood Ionized Calcium   4.4 L 


 


Coronavirus (PCR)   














  04/17/21 04/17/21 04/17/21





  04:19 04:19 05:28


 


RBC   


 


Hgb   


 


Hct   


 


MCH  26 L  


 


RDW  15.9 H  


 


Lymph % (Auto)  8.1 L  


 


Lymph # (Auto)  0.8 L  


 


Seg Neutrophils %  84.8 H  


 


Seg Neutrophils #  8.3 H  


 


Thrombin Time   


 


ABG pH   


 


POC ABG pCO2   


 


POC ABG pO2   


 


ABG Oxyhemoglobin   


 


ABG Sodium   


 


ABG Glucose   


 


Sodium   


 


Chloride   


 


Glucose   53 L 


 


POC Glucose    156 H


 


Calcium   8.1 L 


 


Phosphorus   


 


Total Creatine Kinase   


 


C-Reactive Protein   


 


Total Protein   


 


Albumin   2.7 L 


 


Arterial Blood Glucose   


 


Arterial Blood Ionized Calcium   


 


Coronavirus (PCR)   














  04/17/21 04/17/21 04/17/21





  06:20 11:43 15:39


 


RBC   


 


Hgb   


 


Hct   


 


MCH   


 


RDW   


 


Lymph % (Auto)   


 


Lymph # (Auto)   


 


Seg Neutrophils %   


 


Seg Neutrophils #   


 


Thrombin Time   


 


ABG pH  7.486 H  


 


POC ABG pCO2  31.5 L  


 


POC ABG pO2   


 


ABG Oxyhemoglobin   


 


ABG Sodium   


 


ABG Glucose  58 L  


 


Sodium   


 


Chloride   


 


Glucose   


 


POC Glucose   180 H 


 


Calcium   


 


Phosphorus    1.90 L


 


Total Creatine Kinase   


 


C-Reactive Protein    4.80 H


 


Total Protein   


 


Albumin   


 


Arterial Blood Glucose  58 L  


 


Arterial Blood Ionized Calcium   


 


Coronavirus (PCR)   














  04/17/21 04/17/21 04/17/21





  17:01 23:24 Unknown


 


RBC   


 


Hgb   


 


Hct   


 


MCH   


 


RDW   


 


Lymph % (Auto)   


 


Lymph # (Auto)   


 


Seg Neutrophils %   


 


Seg Neutrophils #   


 


Thrombin Time   


 


ABG pH   


 


POC ABG pCO2   


 


POC ABG pO2   


 


ABG Oxyhemoglobin   


 


ABG Sodium   


 


ABG Glucose   


 


Sodium   


 


Chloride   


 


Glucose   


 


POC Glucose  227 H  256 H 


 


Calcium   


 


Phosphorus   


 


Total Creatine Kinase   


 


C-Reactive Protein   


 


Total Protein   


 


Albumin   


 


Arterial Blood Glucose   


 


Arterial Blood Ionized Calcium   


 


Coronavirus (PCR)    Positive A














  04/18/21 04/18/21





  03:58 05:13


 


RBC  


 


Hgb  


 


Hct  


 


MCH  


 


RDW  


 


Lymph % (Auto)  


 


Lymph # (Auto)  


 


Seg Neutrophils %  


 


Seg Neutrophils #  


 


Thrombin Time  


 


ABG pH  7.525 H 


 


POC ABG pCO2  


 


POC ABG pO2  


 


ABG Oxyhemoglobin  


 


ABG Sodium  132.4 L 


 


ABG Glucose  204 H 


 


Sodium  


 


Chloride  


 


Glucose  


 


POC Glucose   190 H


 


Calcium  


 


Phosphorus  


 


Total Creatine Kinase  


 


C-Reactive Protein  


 


Total Protein  


 


Albumin  


 


Arterial Blood Glucose  204 H 


 


Arterial Blood Ionized Calcium  


 


Coronavirus (PCR)  











Allied health notes reviewed: nursing

## 2021-04-18 NOTE — VASCULAR LAB REPORT
DUPLEX DOPPLER LOWER EXTREMITY VEINS, BILATERAL



INDICATION / CLINICAL INFORMATION:

pain; swelling.



TECHNIQUE:

Duplex doppler imaging was performed through the veins of both lower extremities using venous vandana
sameer and other maneuvers.



COMPARISON: 

None available.



FINDINGS:

RIGHT COMMON FEMORAL VEIN: Negative.

RIGHT FEMORAL VEIN: Negative.

RIGHT POPLITEAL VEIN: Negative.

RIGHT CALF VEINS: Negative.



LEFT COMMON FEMORAL VEIN: Negative.

LEFT FEMORAL VEIN: Negative.

LEFT POPLITEAL VEIN: Negative.

LEFT CALF VEINS: Negative.



ADDITIONAL FINDINGS: 3.8 cm hypoechoic structure in the medial right thigh



IMPRESSION:

1. No sonographic evidence for DVT in either lower extremity.

2. 3.8 cm hypoechoic structure in the medial right thigh



Signer Name: Juan Mahoney MD FACR 

Signed: 4/18/2021 6:44 PM

Workstation Name: [id number]

## 2021-04-18 NOTE — PROGRESS NOTE
Assessment and Plan





This is a 71-year-old female who is currently visiting her family in [facility] with 

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 

Metastatic Lung Neoplasm brought to the ER by EMS after she was found 

unresponsive by family in the morning.





Acute hypoxemic respiratory failure 


COVID-19 infection


Metastatic lung cancer


Possible acute CVA


Metabolic encephalopathy


Hyponatremia hypoglycemia


Hypoglycemia


History of CVA with aphasia


Vascular dementia


Severe protein calorie malnutrition


Febrile illness





-Continue to monitor in the critical care with frequent neuro checkups


-Patient currently intubated, critical care following


-Discussed with patient daughter and requesting for DNR and hospice service


-We will continue current management and plan, consult  for hospice 

placement


-Continue D5 10 for now to prevent hypoglycemia, 


-Start on Unasyn prophylactically for possible aspiration pneumonitis


-DVT and GI prophylaxis


-Wait for hospice placement


-No specific treatment for COVID-19 as patient is pending hospice








The high probability of a clinically significant, sudden or life threatening 

deterioration of the [multi] system(s) required my full and direct attention, 

intervention and personal management. The aggregate critical care time was [35.]

 minutes. This time is in addition to time spent performing reported procedures 

but includes the following: 





[x] Data Review and interpretation 





[x] Patient assessment and monitoring of vital signs 





[x] Documentation 





[x] Medication orders and management











Subjective


Date of service: 04/18/21


Interval history: 





Patient seen and examined.  Medical records and medication list reviewed.  


Patient remains intubated


Patient requested for DNR and hospice service by daughter


Pending hospice placement





Objective





- Exam


Narrative Exam: 








General appearance: Present: severe distress, cachectic





- EENT


Eyes: Present: miosis


ENT: hearing decreased





- Neck


Neck: Present: supple, normal ROM





- Respiratory


Respiratory effort: labored


Respiratory: bilateral: diminished, rhonchi





- Cardiovascular


Heart Sounds: Present: S1 & S2.  Absent: rub, click





- Extremities


Extremities: pulses symmetrical, No edema


Peripheral Pulses: within normal limits





- Abdominal


General gastrointestinal: Present: soft, non-tender, non-distended, normal bowel

 sounds


Female genitourinary: Present: normal





- Musculoskeletal


Musculoskeletal: generalized weakness





- Psychiatric


Psychiatric: no appropriate mood/affect, no intact judgment & insight, no memory

 intact





- Neurologic


Neurologic: no CNII-XII intact, focal deficits, no moves all extremities, no 

gait normal








- Constitutional


Vitals: 


                               Vital Signs - 12hr











  04/17/21 04/17/21 04/17/21





  23:30 23:36 23:53


 


Temperature   100.7 F H


 


Pulse Rate 121 H 121 H 


 


Pulse Rate [   





From Monitor]   


 


Respiratory 18  





Rate   


 


Blood Pressure 130/59 129/75 


 


O2 Sat by Pulse 99 98 





Oximetry   














  04/18/21 04/18/21 04/18/21





  00:00 00:30 01:00


 


Temperature   


 


Pulse Rate 122 H 123 H 91 H


 


Pulse Rate [   





From Monitor]   


 


Respiratory 18 18 19





Rate   


 


Blood Pressure 130/72 130/72 124/63


 


O2 Sat by Pulse 98 99 





Oximetry   














  04/18/21 04/18/21 04/18/21





  01:30 02:00 02:30


 


Temperature   


 


Pulse Rate 95 H 93 H 94 H


 


Pulse Rate [   





From Monitor]   


 


Respiratory 20 19 16





Rate   


 


Blood Pressure 124/63 116/62 116/62


 


O2 Sat by Pulse 97  99





Oximetry   














  04/18/21 04/18/21 04/18/21





  03:00 03:30 03:40


 


Temperature   


 


Pulse Rate 96 H 95 H 95 H


 


Pulse Rate [   





From Monitor]   


 


Respiratory 20 20 





Rate   


 


Blood Pressure 119/65 119/65 119/65


 


O2 Sat by Pulse 97 98 98





Oximetry   














  04/18/21 04/18/21 04/18/21





  04:00 04:30 05:00


 


Temperature 100.4 F H  


 


Pulse Rate 95 H 100 H 97 H


 


Pulse Rate [   





From Monitor]   


 


Respiratory 19 22 20





Rate   


 


Blood Pressure 118/63 118/63 120/62


 


O2 Sat by Pulse 97 98 98





Oximetry   














  04/18/21 04/18/21 04/18/21





  05:30 06:00 06:30


 


Temperature   


 


Pulse Rate 98 H 94 H 95 H


 


Pulse Rate [   





From Monitor]   


 


Respiratory 21 19 20





Rate   


 


Blood Pressure 120/62 118/63 118/63


 


O2 Sat by Pulse 99  99





Oximetry   














  04/18/21 04/18/21 04/18/21





  07:00 07:30 07:48


 


Temperature   102.3 F H


 


Pulse Rate 97 H 98 H 


 


Pulse Rate [   





From Monitor]   


 


Respiratory 18 20 





Rate   


 


Blood Pressure 124/63 124/63 


 


O2 Sat by Pulse 98 98 





Oximetry   














  04/18/21 04/18/21 04/18/21





  08:00 08:11 08:30


 


Temperature   


 


Pulse Rate 104 H 102 H 105 H


 


Pulse Rate [ 97 H  





From Monitor]   


 


Respiratory 17  22





Rate   


 


Blood Pressure 115/64 115/64 115/64


 


O2 Sat by Pulse 96 97 98





Oximetry   














  04/18/21 04/18/21 04/18/21





  09:00 09:30 10:00


 


Temperature   


 


Pulse Rate 104 H 99 H 96 H


 


Pulse Rate [   





From Monitor]   


 


Respiratory 21 19 19





Rate   


 


Blood Pressure 128/67 128/67 113/64


 


O2 Sat by Pulse 97 97 98





Oximetry   














  04/18/21 04/18/21





  10:30 11:00


 


Temperature  


 


Pulse Rate 94 H 90


 


Pulse Rate [  





From Monitor]  


 


Respiratory 19 15





Rate  


 


Blood Pressure 113/64 101/60


 


O2 Sat by Pulse 98 97





Oximetry  














- Labs


CBC & Chem 7: 


                                 04/17/21 04:19





                                 04/17/21 04:19


Labs: 


                              Abnormal lab results











  04/17/21 04/17/21 04/17/21 Range/Units





  11:43 15:39 17:01 


 


ABG pH     (7.320-7.450)  


 


ABG Sodium     (136.0-145.0)  mmol/L


 


ABG Glucose     (65-95)  mg/dL


 


POC Glucose  180 H   227 H  ()  mg/dL


 


Phosphorus   1.90 L   (2.5-4.5)  mg/dL


 


C-Reactive Protein   4.80 H   (0.00-1.30)  mg/dL


 


Arterial Blood Glucose     (65-95)  mg/dL


 


Coronavirus (PCR)     (Negative)  














  04/17/21 04/17/21 04/18/21 Range/Units





  23:24 Unknown 03:58 


 


ABG pH    7.525 H  (7.320-7.450)  


 


ABG Sodium    132.4 L  (136.0-145.0)  mmol/L


 


ABG Glucose    204 H  (65-95)  mg/dL


 


POC Glucose  256 H    ()  mg/dL


 


Phosphorus     (2.5-4.5)  mg/dL


 


C-Reactive Protein     (0.00-1.30)  mg/dL


 


Arterial Blood Glucose    204 H  (65-95)  mg/dL


 


Coronavirus (PCR)   Positive A   (Negative)  














  04/18/21 Range/Units





  05:13 


 


ABG pH   (7.320-7.450)  


 


ABG Sodium   (136.0-145.0)  mmol/L


 


ABG Glucose   (65-95)  mg/dL


 


POC Glucose  190 H  ()  mg/dL


 


Phosphorus   (2.5-4.5)  mg/dL


 


C-Reactive Protein   (0.00-1.30)  mg/dL


 


Arterial Blood Glucose   (65-95)  mg/dL


 


Coronavirus (PCR)   (Negative)  














HEART Score





- HEART Score


Troponin: 


                                        











Troponin T  0.010 ng/mL (0.00-0.029)   04/16/21  14:06

## 2021-04-18 NOTE — EVENT NOTE
Date: 04/18/21





ID consulted.  Discussed with Dr. Leija, plan is for hospice.  ID will sign 

off.  Please call with questions or any changes.

## 2021-04-19 RX ADMIN — FAMOTIDINE SCH MG: 10 INJECTION, SOLUTION INTRAVENOUS at 09:43

## 2021-04-19 RX ADMIN — Medication SCH ML: at 22:15

## 2021-04-19 RX ADMIN — AMPICILLIN AND SULBACTAM SCH MLS/HR: 1; .5 INJECTION, POWDER, FOR SOLUTION INTRAMUSCULAR; INTRAVENOUS at 00:55

## 2021-04-19 RX ADMIN — AMPICILLIN AND SULBACTAM SCH MLS/HR: 1; .5 INJECTION, POWDER, FOR SOLUTION INTRAMUSCULAR; INTRAVENOUS at 06:11

## 2021-04-19 RX ADMIN — INSULIN HUMAN SCH UNITS: 100 INJECTION, SOLUTION PARENTERAL at 18:46

## 2021-04-19 RX ADMIN — FAMOTIDINE SCH MG: 10 INJECTION, SOLUTION INTRAVENOUS at 22:15

## 2021-04-19 RX ADMIN — ENOXAPARIN SODIUM SCH MG: 100 INJECTION SUBCUTANEOUS at 22:15

## 2021-04-19 RX ADMIN — INSULIN HUMAN SCH UNITS: 100 INJECTION, SOLUTION PARENTERAL at 06:11

## 2021-04-19 RX ADMIN — Medication SCH ML: at 09:43

## 2021-04-19 RX ADMIN — INSULIN HUMAN SCH UNITS: 100 INJECTION, SOLUTION PARENTERAL at 00:55

## 2021-04-19 RX ADMIN — AMPICILLIN AND SULBACTAM SCH: 1; .5 INJECTION, POWDER, FOR SOLUTION INTRAMUSCULAR; INTRAVENOUS at 11:51

## 2021-04-19 RX ADMIN — ACETAMINOPHEN PRN MG: 325 SUSPENSION ORAL at 08:14

## 2021-04-19 RX ADMIN — INSULIN HUMAN SCH UNITS: 100 INJECTION, SOLUTION PARENTERAL at 11:52

## 2021-04-19 NOTE — XRAY REPORT
CHEST - 1 VIEW



INDICATION:  follow up respiratory failure 



COMPARISON:

Yesterday



FINDINGS:



SUPPORT DEVICES:  Stable support device positioning.



HEART:  Stable cardiomediastinal silhouette.



LUNGS/PLEURA:  Innumerable pulmonary nodules are unchanged.  



ADDITIONAL FINDINGS:  None.



IMPRESSION:  



Unchanged exam.



Signer Name: Nimesh Oviedo MD 

Signed: 4/19/2021 3:12 AM

Workstation Name: [id number]

## 2021-04-19 NOTE — PROGRESS NOTE
<ALBINAESTELLA DAVISJudy - Last Filed: 04/19/21 17:05>





Assessment and Plan


Assessment and plan: 


This is a 71-year-old female who is currently visiting her family in [facility] with 

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 

Metastatic Lung Neoplasm brought to the ER by EMS after she was found 

unresponsive by family in the morning.





Acute hypoxemic respiratory failure 


COVID-19 infection


Metastatic lung cancer


Possible acute CVA


Metabolic encephalopathy


Hyponatremia hypoglycemia


Hypoglycemia


History of CVA with aphasia


Vascular dementia


Severe protein calorie malnutrition


Febrile illness





-CCM, neurology, ID consulted, appreciate recommendations


-Frequent neuro checks


-EEG pending


-Droplet/Contact isolation


-MV, wean as tolerated, VAP bundle


-Daughter requesting for DNR and hospice service


-Continue D5 10 for now to prevent hypoglycemia, 


-abx for CAP coverage


-CM consult to aid in placement of hospice





DVT and GI prophylaxis: PPI, Lovenox, SCDS to BLE while in bed








The high probability of a clinically significant, sudden or life threatening 

deterioration of the [multi] system(s) required my full and direct attention, 

intervention and personal management. The aggregate critical care time was [35.]

 minutes. This time is in addition to time spent performing reported procedures 

but includes the following: 





[x] Data Review and interpretation 





[x] Patient assessment and monitoring of vital signs 





[x] Documentation 





[x] Medication orders and management








History


Interval history: 


This is a 71-year-old female with CVA complicated by aphasia, vascular dementia,

 cerebral sclerosis, metastatic lung neoplasm who presented to the emergency 

department on 4/16 was found unresponsive by the family on 4/16.  Patient was fo

und to have a focal neurological deficit with symptoms consistent with CVA, 

hypoxemia and inability to protect her airway the patient was intubated and 

placed on mechanical ventilation.  Patient was found to have acute 

encephalopathy, metastatic lung cancer.  Patient was admitted to the ICU as a 

COVID-19 PUI with consults to infectious disease, neurology and CCM.





4/17: Patient family requested a DNR and hospice placement





4/18: Infectious disease signed off, pending hospice placement





4/19: Patient was febrile to 102.1 this morning and she was started on 

antibiotic therapy for CAP coverage for 5 days.  At the time my examination 

patient was sedated on propofol on assist control tidal volume 450, rate 12, 

PEEP 6, 35% FiO2 and later she was trialed on CPAP 10/5 with a possibility to 

extubate.   is working on obtaining hospice placement per daughter's

 wishes.  However given COVID-19 positive status patient would not qualify for 

inpatient hospice care and will need home hospice.  EEG pending.  





Hospitalist Physical





- Constitutional


Vitals: 


                                        











Temp Pulse Resp BP Pulse Ox


 


 100.7 F H  90   21   130/63   97 


 


 04/19/21 12:00  04/19/21 13:00  04/19/21 13:00  04/19/21 13:00  04/19/21 13:00











General appearance: Present: no acute distress, cachectic





- EENT


Eyes: Present: PERRL





- Neck


Neck: Absent: masses or JVD





- Respiratory


Respiratory effort: normal


Respiratory: bilateral: diminished





- Cardiovascular


Rhythm: regular


Heart Sounds: Present: S1 & S2, systolic murmur.  Absent: diastolic murmur





- Extremities


Extremities: no ischemia, pulses intact, pulses symmetrical, No edema, normal 

temperature, normal color


Peripheral Pulses: within normal limits





- Abdominal


General gastrointestinal: soft, non-tender, non-distended, normal bowel sounds





- Integumentary


Integumentary: Present: warm, dry





- Psychiatric


Psychiatric: other (sedated on MV)





- Neurologic


Neurologic: other (sedated)





HEART Score





- HEART Score


Troponin: 


                                        











Troponin T  0.010 ng/mL (0.00-0.029)   04/16/21  14:06    














Results





- Labs


CBC & Chem 7: 


                                 04/17/21 04:19





                                 04/17/21 04:19


Labs: 


                             Laboratory Last Values











WBC  9.6 K/mm3 (4.5-11.0)   04/17/21  04:19    


 


RBC  4.58 M/mm3 (3.65-5.03)   04/17/21  04:19    


 


Hgb  12.1 gm/dl (10.1-14.3)   04/17/21  04:19    


 


Hct  38.2 % (30.3-42.9)   04/17/21  04:19    


 


MCV  83 fl (79-97)   04/17/21  04:19    


 


MCH  26 pg (28-32)  L  04/17/21  04:19    


 


MCHC  32 % (30-34)   04/17/21  04:19    


 


RDW  15.9 % (13.2-15.2)  H  04/17/21  04:19    


 


Plt Count  297 K/mm3 (140-440)   04/17/21  04:19    


 


Lymph % (Auto)  8.1 % (13.4-35.0)  L  04/17/21  04:19    


 


Mono % (Auto)  7.0 % (0.0-7.3)   04/17/21  04:19    


 


Eos % (Auto)  0.0 % (0.0-4.3)   04/17/21  04:19    


 


Baso % (Auto)  0.1 % (0.0-1.8)   04/17/21  04:19    


 


Lymph # (Auto)  0.8 K/mm3 (1.2-5.4)  L  04/17/21  04:19    


 


Mono # (Auto)  0.7 K/mm3 (0.0-0.8)   04/17/21  04:19    


 


Eos # (Auto)  0.0 K/mm3 (0.0-0.4)   04/17/21  04:19    


 


Baso # (Auto)  0.0 K/mm3 (0.0-0.1)   04/17/21  04:19    


 


Add Manual Diff  Complete   04/17/21  04:19    


 


Seg Neutrophils %  84.8 % (40.0-70.0)  H  04/17/21  04:19    


 


Nucleated RBC %  Not Reportable   04/17/21  04:19    


 


Seg Neutrophils #  8.3 K/mm3 (1.8-7.7)  H  04/17/21  04:19    


 


WBC Morphology  Not Reportable   04/17/21  04:19    


 


Hypersegmented Neuts  Not Reportable   04/17/21  04:19    


 


Hyposegmented Neuts  Not Reportable   04/17/21  04:19    


 


Hypogranular Neuts  Not Reportable   04/17/21  04:19    


 


Smudge Cells  Not Reportable   04/17/21  04:19    


 


Toxic Granulation  Not Reportable   04/17/21  04:19    


 


Toxic Vacuolation  Not Reportable   04/17/21  04:19    


 


Dohle Bodies  Not Reportable   04/17/21  04:19    


 


Pelger-Huet Anomaly  Not Reportable   04/17/21  04:19    


 


Tiffanie Rods  Not Reportable   04/17/21  04:19    


 


Platelet Estimate  Not Reportable   04/17/21  04:19    


 


Clumped Platelets  Not Reportable   04/17/21  04:19    


 


Plt Clumps, EDTA  Not Reportable   04/17/21  04:19    


 


Large Platelets  Not Reportable   04/17/21  04:19    


 


Giant Platelets  Not Reportable   04/17/21  04:19    


 


Platelet Satelliting  Not Reportable   04/17/21  04:19    


 


Plt Morphology Comment  Not Reportable   04/17/21  04:19    


 


RBC Morphology  Not Reportable   04/17/21  04:19    


 


Dimorphic RBCs  Not Reportable   04/17/21  04:19    


 


Polychromasia  Not Reportable   04/17/21  04:19    


 


Hypochromasia  Not Reportable   04/17/21  04:19    


 


Poikilocytosis  Not Reportable   04/17/21  04:19    


 


Anisocytosis  Not Reportable   04/17/21  04:19    


 


Microcytosis  Not Reportable   04/17/21  04:19    


 


Macrocytosis  Not Reportable   04/17/21  04:19    


 


Spherocytes  Not Reportable   04/17/21  04:19    


 


Pappenheimer Bodies  Not Reportable   04/17/21  04:19    


 


Sickle Cells  Not Reportable   04/17/21  04:19    


 


Target Cells  Not Reportable   04/17/21  04:19    


 


Tear Drop Cells  Not Reportable   04/17/21  04:19    


 


Ovalocytes  Not Reportable   04/17/21  04:19    


 


Helmet Cells  Not Reportable   04/17/21  04:19    


 


[place] Bodies  Not Reportable   04/17/21  04:19    


 


Cabot Rings  Not Reportable   04/17/21  04:19    


 


[place] Cells  Not Reportable   04/17/21  04:19    


 


Bite Cells  Not Reportable   04/17/21  04:19    


 


Crenated Cell  Not Reportable   04/17/21  04:19    


 


Elliptocytes  Not Reportable   04/17/21  04:19    


 


Acanthocytes (Spur)  Not Reportable   04/17/21  04:19    


 


Rouleaux  Not Reportable   04/17/21  04:19    


 


Hemoglobin C Crystals  Not Reportable   04/17/21  04:19    


 


Schistocytes  Not Reportable   04/17/21  04:19    


 


Malaria parasites  Not Reportable   04/17/21  04:19    


 


Saleem Bodies  Not Reportable   04/17/21  04:19    


 


Hem Pathologist Commnt  Not Reportable   04/17/21  04:19    


 


PT  12.8 Sec. (12.2-14.9)   04/16/21  14:06    


 


INR  0.98  (0.87-1.13)   04/16/21  14:06    


 


APTT  26.0 Sec. (24.2-36.6)   04/16/21  14:06    


 


Thrombin Time  19.8 Sec. (15.1-19.6)  H  04/16/21  14:06    


 


ABG pH  7.481  (7.320-7.450)  H  04/19/21  14:27    


 


POC ABG pCO2  36.3 mmHg (32.0-48.0)   04/19/21  14:27    


 


POC ABG pO2  69.1 mmHg ()  L  04/19/21  14:27    


 


POC ABG HCO3  26.5   04/19/21  14:27    


 


ABG O2 Saturation  95.0  (0-100)   04/19/21  14:27    


 


POC ABG Base Excess  3.1   04/19/21  14:27    


 


ABG Hemoglobin  12.6  (12.0-17.5)   04/19/21  14:27    


 


ABG Oxyhemoglobin  94.1  (94-98)   04/19/21  14:27    


 


ABG Methemoglobin  0.3  (0.0-1.5)   04/19/21  14:27    


 


ABG Sodium  126.7 mmol/L (136.0-145.0)  L  04/19/21  14:27    


 


ABG Potassium  4.2 mmol/L (3.40-4.50)   04/19/21  14:27    


 


ABG Chloride  99.0 mmol/L ()   04/19/21  14:27    


 


ABG Glucose  329 mg/dL (65-95)  H  04/19/21  14:27    


 


Carboxyhemoglobin  0.6  (0.5-1.5)   04/19/21  14:27    


 


FiO2 %  35   04/19/21  14:27    


 


Sodium  139 mmol/L (137-145)  D 04/17/21  04:19    


 


Potassium  4.2 mmol/L (3.6-5.0)   04/17/21  04:19    


 


Chloride  103.0 mmol/L ()   04/17/21  04:19    


 


Carbon Dioxide  23 mmol/L (22-30)   04/17/21  04:19    


 


Anion Gap  17 mmol/L  04/17/21  04:19    


 


BUN  13 mg/dL (7-17)   04/17/21  04:19    


 


Creatinine  0.6 mg/dL (0.6-1.2)   04/17/21  04:19    


 


Estimated GFR  > 60 ml/min  04/17/21  04:19    


 


BUN/Creatinine Ratio  22 %  04/17/21  04:19    


 


Glucose  53 mg/dL ()  L  04/17/21  04:19    


 


POC Glucose  221 mg/dL ()  H  04/19/21  05:23    


 


Calcium  8.1 mg/dL (8.4-10.2)  L  04/17/21  04:19    


 


Phosphorus  1.90 mg/dL (2.5-4.5)  L  04/17/21  15:39    


 


Magnesium  1.90 mg/dL (1.7-2.3)   04/17/21  15:39    


 


Total Bilirubin  0.40 mg/dL (0.1-1.2)   04/17/21  04:19    


 


AST  31 units/L (5-40)   04/17/21  04:19    


 


ALT  12 units/L (7-56)   04/17/21  04:19    


 


Alkaline Phosphatase  59 units/L ()   04/17/21  04:19    


 


Ammonia  33.0 umol/L (25-60)   04/16/21  14:06    


 


Total Creatine Kinase  185 units/L ()  H  04/16/21  14:06    


 


CK-MB (CK-2)  1.1 ng/mL (0.0-4.0)   04/16/21  14:06    


 


CK-MB (CK-2) Rel Index  0.5  (0-4)   04/16/21  14:06    


 


Troponin T  0.010 ng/mL (0.00-0.029)   04/16/21  14:06    


 


C-Reactive Protein  4.80 mg/dL (0.00-1.30)  H  04/17/21  15:39    


 


Total Protein  7.5 g/dL (6.3-8.2)  D 04/17/21  04:19    


 


Albumin  2.7 g/dL (3.9-5)  L  04/17/21  04:19    


 


Albumin/Globulin Ratio  0.6 %  04/17/21  04:19    


 


Procalcitonin  0.81 ng/mL (<0.15)   04/17/21  15:39    


 


TSH  1.450 mlU/mL (0.270-4.200)   04/16/21  14:06    


 


Arterial Blood Glucose  329 mg/dL (65-95)  H  04/19/21  14:27    


 


Arterial Blood Ionized Calcium  4.6 mg/dL (4.6-5.3)   04/19/21  14:27    


 


Urine Color  Yellow  (Yellow)   04/16/21  16:01    


 


Urine Turbidity  Clear  (Clear)   04/16/21  16:01    


 


Urine pH  6.0  (5.0-7.0)   04/16/21  16:01    


 


Ur Specific Gravity  1.015  (1.003-1.030)   04/16/21  16:01    


 


Urine Protein  30 mg/dl mg/dL (Negative)   04/16/21  16:01    


 


Urine Glucose (UA)  150 mg/dL (Negative)   04/16/21  16:01    


 


Urine Ketones  Neg mg/dL (Negative)   04/16/21  16:01    


 


Urine Blood  Neg  (Negative)   04/16/21  16:01    


 


Urine Nitrite  Neg  (Negative)   04/16/21  16:01    


 


Urine Bilirubin  Neg  (Negative)   04/16/21  16:01    


 


Urine Urobilinogen  < 2.0 mg/dL (<2.0)   04/16/21  16:01    


 


Ur Leukocyte Esterase  Neg  (Negative)   04/16/21  16:01    


 


Urine WBC (Auto)  1.0 /HPF (0.0-6.0)   04/16/21  16:01    


 


Urine RBC (Auto)  1.0 /HPF (0.0-6.0)   04/16/21  16:01    


 


U Epithel Cells (Auto)  < 1.0 /HPF (0-13.0)   04/16/21  16:01    


 


Urine Mucus  Few /HPF  04/16/21  16:01    


 


Urine Opiates Screen  Presumptive negative   04/16/21  14:48    


 


Urine Methadone Screen  Presumptive negative   04/16/21  14:48    


 


Ur Barbiturates Screen  Presumptive negative   04/16/21  14:48    


 


Ur Phencyclidine Scrn  Presumptive negative   04/16/21  14:48    


 


Ur Amphetamines Screen  Presumptive negative   04/16/21  14:48    


 


U Benzodiazepines Scrn  Presumptive negative   04/16/21  14:48    


 


Urine Cocaine Screen  Presumptive negative   04/16/21  14:48    


 


U Marijuana (THC) Screen  Presumptive negative   04/16/21  14:48    


 


Drugs of Abuse Note  Disclamer   04/16/21  14:48    


 


Plasma/Serum Alcohol  0.01 % (0-0.07)   04/16/21  14:06    


 


Coronavirus (PCR)  Positive  (Negative)  A  04/17/21  Unknown











Microbiology: 


Microbiology





04/16/21 16:44   Tracheal Aspirate   Sputum Culture - Final








[place]/IV: 


                                        





Voiding Method                   Indwelling Catheter











Active Medications





- Current Medications


Current Medications: 














Generic Name Dose Route Start Last Admin





  Trade Name Freq  PRN Reason Stop Dose Admin


 


Acetaminophen  650 mg  04/16/21 17:04  04/19/21 08:14





  Acetaminophen 325 Mg/10.15 Ml Oral Liqd Unit Dose  FEEDTUBE   650 mg





  Q6H PRN   Administration





  Pain MILD(1-3)/Fever >100.5/HA  


 


Albuterol  2.5 mg  04/16/21 17:04 





  Albuterol 2.5 Mg/3 Ml Nebu  IH  





  Q3HRT PRN  





  Shortness Of Breath  


 


Lipase/Protease/Amylase  1 each  04/18/21 11:10 





  Lipase 10,500/Protease 25,000/Amylase 43,750 (Units) Dr Tucker  FEEDTUBE  





  PRN PRN  





  For Clogged Feeding Tube  


 


Dextrose  0 ml  04/17/21 04:13  04/17/21 04:46





  Dextrose 50% In Water (25gm) 50 Ml Syringe  IV   20 ml





  Q30MIN PRN   Administration





  Hypoglycemia  





  Protocol  


 


Enoxaparin Sodium  40 mg  04/17/21 22:00  04/18/21 22:11





  Enoxaparin 40 Mg/0.4 Ml Inj  SUB-Q   40 mg





  QDAY@2200 ANEL   Administration





  Protocol  


 


Famotidine  20 mg  04/17/21 10:00  04/19/21 09:43





  Famotidine 20 Mg/2 Ml Inj  IV   20 mg





  BID ANEL   Administration


 


Hydrophilic Ointment  1 applic  04/16/21 14:00 





  Lip Therapy Vaseline  TP  





  Q2HR PRN  





  Dry Lips  


 


Propofol  1,000 mg in 100 mls @ 2.041 mls/hr  04/16/21 14:00  04/19/21 12:11





  Diprivan 10 Mg/Ml  IV   0 mcg/kg/min





  TITR ANEL   0 mls/hr





    Titration





  Protocol  





  5 MCG/KG/MIN  


 


Levofloxacin/Dextrose  750 mg in 150 mls @ 100 mls/hr  04/19/21 14:00  04/19/21 

13:43





  Levaquin 750mg/150ml  IV  04/22/21 15:29  100 mls/hr





  Q24H ANEL   Administration





  Protocol  


 


Insulin Human Regular  0 units  04/18/21 00:00  04/19/21 11:52





  Insulin Regular, Human 100 Units/1 Ml  SUB-Q   2 units





  Q6H ANEL   Administration





  Protocol  


 


Multi-Ingred Cream/Lotion/Oil/Oint  1 applic  04/16/21 14:00 





  Mineral Oil/Petrolatum, White Ophth Oint 3.5 Gm  OU  





  Q4HR PRN  





  Dry Eye(s)  


 


Simple Syrup  15 ml  04/18/21 11:10 





  Simple Syrup 15 Ml  FEEDTUBE  





  PRN PRN  





  Hypoglycemia  


 


Simple Syrup  30 ml  04/18/21 11:10 





  Simple Syrup 15 Ml  FEEDTUBE  





  PRN PRN  





  Hypoglycemia  


 


Sodium Bicarbonate  325 mg  04/18/21 11:10 





  Sodium Bicarbonate 325 Mg Tab  FEEDTUBE  





  PRN PRN  





  For Clogged Feeding Tube  


 


Sodium Chloride  10 ml  04/16/21 22:00  04/19/21 09:43





  Sodium Chloride 0.9% 10 Ml Flush Syringe  IV   10 ml





  BID ANEL   Administration


 


Sodium Chloride  10 ml  04/16/21 17:04 





  Sodium Chloride 0.9% 10 Ml Flush Syringe  IV  





  PRN PRN  





  LINE FLUSH  














Nutrition/Malnutrition Assess





- Dietary Evaluation


Nutrition/Malnutrition Findings: 


                                        





Nutrition Notes                                            Start:  04/17/21 

08:22


Freq:                                                      Status: Active       

 


Protocol:                                                                       

 


 Document     04/18/21 11:12  LP  (Rec: 04/18/21 11:13  LP  [id number])


 Nutrition Notes


     Need for Assessment generated from:         MD Order


     Initial or Follow up                        Brief Note


     Subjective/Other Information                Consult for TF.


 Nutrition Intervention


     Nutrition Support:                          Promote at 60ml/hr


                                                 Flush with 50ml q6h


     Kcal                                        1,440


     Protein (gm)                                90


     Fluid (mL)                                  1,208


     Goal #1                                     Meet at least 80% of kcal and


                                                 protein needs


     Anticipated Discharge Needs:                Unable to determine at this


                                                 time


     Follow-Up By:                               04/20/21


     Additional Comments                         Follow for TF start/tolerance











<MAIKOL MALDONADO R - Last Filed: 04/19/21 23:41>





Assessment and Plan


Assessment and plan: 





I saw and evaluated the patient. I agree with the findings and the plan of care 

as documented in the Nurse Practitioner's~note,








Hospitalist Physical





- Constitutional


Vitals: 


                                        











Temp Pulse Resp BP Pulse Ox


 


 99.1 F   93 H  18   118/62   95 


 


 04/19/21 19:34  04/19/21 22:52  04/19/21 22:15  04/19/21 22:52  04/19/21 22:52














HEART Score





- HEART Score


Troponin: 


                                        











Troponin T  0.010 ng/mL (0.00-0.029)   04/16/21  14:06    














Results





- Labs


CBC & Chem 7: 


                                 04/17/21 04:19





                                 04/17/21 04:19


Labs: 


                             Laboratory Last Values











WBC  9.6 K/mm3 (4.5-11.0)   04/17/21  04:19    


 


RBC  4.58 M/mm3 (3.65-5.03)   04/17/21  04:19    


 


Hgb  12.1 gm/dl (10.1-14.3)   04/17/21  04:19    


 


Hct  38.2 % (30.3-42.9)   04/17/21  04:19    


 


MCV  83 fl (79-97)   04/17/21  04:19    


 


MCH  26 pg (28-32)  L  04/17/21  04:19    


 


MCHC  32 % (30-34)   04/17/21  04:19    


 


RDW  15.9 % (13.2-15.2)  H  04/17/21  04:19    


 


Plt Count  297 K/mm3 (140-440)   04/17/21  04:19    


 


Lymph % (Auto)  8.1 % (13.4-35.0)  L  04/17/21  04:19    


 


Mono % (Auto)  7.0 % (0.0-7.3)   04/17/21  04:19    


 


Eos % (Auto)  0.0 % (0.0-4.3)   04/17/21  04:19    


 


Baso % (Auto)  0.1 % (0.0-1.8)   04/17/21  04:19    


 


Lymph # (Auto)  0.8 K/mm3 (1.2-5.4)  L  04/17/21  04:19    


 


Mono # (Auto)  0.7 K/mm3 (0.0-0.8)   04/17/21  04:19    


 


Eos # (Auto)  0.0 K/mm3 (0.0-0.4)   04/17/21  04:19    


 


Baso # (Auto)  0.0 K/mm3 (0.0-0.1)   04/17/21  04:19    


 


Add Manual Diff  Complete   04/17/21  04:19    


 


Seg Neutrophils %  84.8 % (40.0-70.0)  H  04/17/21  04:19    


 


Nucleated RBC %  Not Reportable   04/17/21  04:19    


 


Seg Neutrophils #  8.3 K/mm3 (1.8-7.7)  H  04/17/21  04:19    


 


WBC Morphology  Not Reportable   04/17/21  04:19    


 


Hypersegmented Neuts  Not Reportable   04/17/21  04:19    


 


Hyposegmented Neuts  Not Reportable   04/17/21  04:19    


 


Hypogranular Neuts  Not Reportable   04/17/21  04:19    


 


Smudge Cells  Not Reportable   04/17/21  04:19    


 


Toxic Granulation  Not Reportable   04/17/21  04:19    


 


Toxic Vacuolation  Not Reportable   04/17/21  04:19    


 


Dohle Bodies  Not Reportable   04/17/21  04:19    


 


Pelger-Huet Anomaly  Not Reportable   04/17/21  04:19    


 


Tiffanie Rods  Not Reportable   04/17/21  04:19    


 


Platelet Estimate  Not Reportable   04/17/21  04:19    


 


Clumped Platelets  Not Reportable   04/17/21  04:19    


 


Plt Clumps, EDTA  Not Reportable   04/17/21  04:19    


 


Large Platelets  Not Reportable   04/17/21  04:19    


 


Giant Platelets  Not Reportable   04/17/21  04:19    


 


Platelet Satelliting  Not Reportable   04/17/21  04:19    


 


Plt Morphology Comment  Not Reportable   04/17/21  04:19    


 


RBC Morphology  Not Reportable   04/17/21  04:19    


 


Dimorphic RBCs  Not Reportable   04/17/21  04:19    


 


Polychromasia  Not Reportable   04/17/21  04:19    


 


Hypochromasia  Not Reportable   04/17/21  04:19    


 


Poikilocytosis  Not Reportable   04/17/21  04:19    


 


Anisocytosis  Not Reportable   04/17/21  04:19    


 


Microcytosis  Not Reportable   04/17/21  04:19    


 


Macrocytosis  Not Reportable   04/17/21  04:19    


 


Spherocytes  Not Reportable   04/17/21  04:19    


 


Pappenheimer Bodies  Not Reportable   04/17/21  04:19    


 


Sickle Cells  Not Reportable   04/17/21  04:19    


 


Target Cells  Not Reportable   04/17/21  04:19    


 


Tear Drop Cells  Not Reportable   04/17/21  04:19    


 


Ovalocytes  Not Reportable   04/17/21  04:19    


 


Helmet Cells  Not Reportable   04/17/21  04:19    


 


[place] Bodies  Not Reportable   04/17/21  04:19    


 


Cabot Rings  Not Reportable   04/17/21  04:19    


 


[place] Cells  Not Reportable   04/17/21  04:19    


 


Bite Cells  Not Reportable   04/17/21  04:19    


 


Crenated Cell  Not Reportable   04/17/21  04:19    


 


Elliptocytes  Not Reportable   04/17/21  04:19    


 


Acanthocytes (Spur)  Not Reportable   04/17/21  04:19    


 


Rouleaux  Not Reportable   04/17/21  04:19    


 


Hemoglobin C Crystals  Not Reportable   04/17/21  04:19    


 


Schistocytes  Not Reportable   04/17/21  04:19    


 


Malaria parasites  Not Reportable   04/17/21  04:19    


 


Saleem Bodies  Not Reportable   04/17/21  04:19    


 


Hem Pathologist Commnt  Not Reportable   04/17/21  04:19    


 


PT  12.8 Sec. (12.2-14.9)   04/16/21  14:06    


 


INR  0.98  (0.87-1.13)   04/16/21  14:06    


 


APTT  26.0 Sec. (24.2-36.6)   04/16/21  14:06    


 


Thrombin Time  19.8 Sec. (15.1-19.6)  H  04/16/21  14:06    


 


ABG pH  7.481  (7.320-7.450)  H  04/19/21  14:27    


 


POC ABG pCO2  36.3 mmHg (32.0-48.0)   04/19/21  14:27    


 


POC ABG pO2  69.1 mmHg ()  L  04/19/21  14:27    


 


POC ABG HCO3  26.5   04/19/21  14:27    


 


ABG O2 Saturation  95.0  (0-100)   04/19/21  14:27    


 


POC ABG Base Excess  3.1   04/19/21  14:27    


 


ABG Hemoglobin  12.6  (12.0-17.5)   04/19/21  14:27    


 


ABG Oxyhemoglobin  94.1  (94-98)   04/19/21  14:27    


 


ABG Methemoglobin  0.3  (0.0-1.5)   04/19/21  14:27    


 


ABG Sodium  126.7 mmol/L (136.0-145.0)  L  04/19/21  14:27    


 


ABG Potassium  4.2 mmol/L (3.40-4.50)   04/19/21  14:27    


 


ABG Chloride  99.0 mmol/L ()   04/19/21  14:27    


 


ABG Glucose  329 mg/dL (65-95)  H  04/19/21  14:27    


 


Carboxyhemoglobin  0.6  (0.5-1.5)   04/19/21  14:27    


 


FiO2 %  35   04/19/21  14:27    


 


Sodium  139 mmol/L (137-145)  D 04/17/21  04:19    


 


Potassium  4.2 mmol/L (3.6-5.0)   04/17/21  04:19    


 


Chloride  103.0 mmol/L ()   04/17/21  04:19    


 


Carbon Dioxide  23 mmol/L (22-30)   04/17/21  04:19    


 


Anion Gap  17 mmol/L  04/17/21  04:19    


 


BUN  13 mg/dL (7-17)   04/17/21  04:19    


 


Creatinine  0.6 mg/dL (0.6-1.2)   04/17/21  04:19    


 


Estimated GFR  > 60 ml/min  04/17/21  04:19    


 


BUN/Creatinine Ratio  22 %  04/17/21  04:19    


 


Glucose  53 mg/dL ()  L  04/17/21  04:19    


 


POC Glucose  196 mg/dL ()  H  04/19/21  18:38    


 


Calcium  8.1 mg/dL (8.4-10.2)  L  04/17/21  04:19    


 


Phosphorus  1.90 mg/dL (2.5-4.5)  L  04/17/21  15:39    


 


Magnesium  1.90 mg/dL (1.7-2.3)   04/17/21  15:39    


 


Total Bilirubin  0.40 mg/dL (0.1-1.2)   04/17/21  04:19    


 


AST  31 units/L (5-40)   04/17/21  04:19    


 


ALT  12 units/L (7-56)   04/17/21  04:19    


 


Alkaline Phosphatase  59 units/L ()   04/17/21  04:19    


 


Ammonia  33.0 umol/L (25-60)   04/16/21  14:06    


 


Total Creatine Kinase  185 units/L ()  H  04/16/21  14:06    


 


CK-MB (CK-2)  1.1 ng/mL (0.0-4.0)   04/16/21  14:06    


 


CK-MB (CK-2) Rel Index  0.5  (0-4)   04/16/21  14:06    


 


Troponin T  0.010 ng/mL (0.00-0.029)   04/16/21  14:06    


 


C-Reactive Protein  4.80 mg/dL (0.00-1.30)  H  04/17/21  15:39    


 


Total Protein  7.5 g/dL (6.3-8.2)  D 04/17/21  04:19    


 


Albumin  2.7 g/dL (3.9-5)  L  04/17/21  04:19    


 


Albumin/Globulin Ratio  0.6 %  04/17/21  04:19    


 


Procalcitonin  0.81 ng/mL (<0.15)   04/17/21  15:39    


 


TSH  1.450 mlU/mL (0.270-4.200)   04/16/21  14:06    


 


Arterial Blood Glucose  329 mg/dL (65-95)  H  04/19/21  14:27    


 


Arterial Blood Ionized Calcium  4.6 mg/dL (4.6-5.3)   04/19/21  14:27    


 


Urine Color  Yellow  (Yellow)   04/16/21  16:01    


 


Urine Turbidity  Clear  (Clear)   04/16/21  16:01    


 


Urine pH  6.0  (5.0-7.0)   04/16/21  16:01    


 


Ur Specific Gravity  1.015  (1.003-1.030)   04/16/21  16:01    


 


Urine Protein  30 mg/dl mg/dL (Negative)   04/16/21  16:01    


 


Urine Glucose (UA)  150 mg/dL (Negative)   04/16/21  16:01    


 


Urine Ketones  Neg mg/dL (Negative)   04/16/21  16:01    


 


Urine Blood  Neg  (Negative)   04/16/21  16:01    


 


Urine Nitrite  Neg  (Negative)   04/16/21  16:01    


 


Urine Bilirubin  Neg  (Negative)   04/16/21  16:01    


 


Urine Urobilinogen  < 2.0 mg/dL (<2.0)   04/16/21  16:01    


 


Ur Leukocyte Esterase  Neg  (Negative)   04/16/21  16:01    


 


Urine WBC (Auto)  1.0 /HPF (0.0-6.0)   04/16/21  16:01    


 


Urine RBC (Auto)  1.0 /HPF (0.0-6.0)   04/16/21  16:01    


 


U Epithel Cells (Auto)  < 1.0 /HPF (0-13.0)   04/16/21  16:01    


 


Urine Mucus  Few /HPF  04/16/21  16:01    


 


Urine Opiates Screen  Presumptive negative   04/16/21  14:48    


 


Urine Methadone Screen  Presumptive negative   04/16/21  14:48    


 


Ur Barbiturates Screen  Presumptive negative   04/16/21  14:48    


 


Ur Phencyclidine Scrn  Presumptive negative   04/16/21  14:48    


 


Ur Amphetamines Screen  Presumptive negative   04/16/21  14:48    


 


U Benzodiazepines Scrn  Presumptive negative   04/16/21  14:48    


 


Urine Cocaine Screen  Presumptive negative   04/16/21  14:48    


 


U Marijuana (THC) Screen  Presumptive negative   04/16/21  14:48    


 


Drugs of Abuse Note  Disclamer   04/16/21  14:48    


 


Plasma/Serum Alcohol  0.01 % (0-0.07)   04/16/21  14:06    


 


Coronavirus (PCR)  Positive  (Negative)  A  04/17/21  Unknown











[place]/IV: 


                                        





Voiding Method                   Indwelling Catheter











Active Medications





- Current Medications


Current Medications: 














Generic Name Dose Route Start Last Admin





  Trade Name Freq  PRN Reason Stop Dose Admin


 


Acetaminophen  650 mg  04/16/21 17:04  04/19/21 08:14





  Acetaminophen 325 Mg/10.15 Ml Oral Liqd Unit Dose  FEEDTUBE   650 mg





  Q6H PRN   Administration





  Pain MILD(1-3)/Fever >100.5/HA  


 


Albuterol  2.5 mg  04/16/21 17:04 





  Albuterol 2.5 Mg/3 Ml Nebu  IH  





  Q3HRT PRN  





  Shortness Of Breath  


 


Lipase/Protease/Amylase  1 each  04/18/21 11:10 





  Lipase 10,500/Protease 25,000/Amylase 43,750 (Units) Dr Cap  FEEDTUBE  





  PRN PRN  





  For Clogged Feeding Tube  


 


Dextrose  0 ml  04/17/21 04:13  04/17/21 04:46





  Dextrose 50% In Water (25gm) 50 Ml Syringe  IV   20 ml





  Q30MIN PRN   Administration





  Hypoglycemia  





  Protocol  


 


Enoxaparin Sodium  40 mg  04/17/21 22:00  04/19/21 22:15





  Enoxaparin 40 Mg/0.4 Ml Inj  SUB-Q   40 mg





  QDAY@2200 ANEL   Administration





  Protocol  


 


Famotidine  20 mg  04/17/21 10:00  04/19/21 22:15





  Famotidine 20 Mg/2 Ml Inj  IV   20 mg





  BID ANEL   Administration


 


Hydrophilic Ointment  1 applic  04/16/21 14:00 





  Lip Therapy Vaseline  TP  





  Q2HR PRN  





  Dry Lips  


 


Propofol  1,000 mg in 100 mls @ 2.041 mls/hr  04/16/21 14:00  04/19/21 12:11





  Diprivan 10 Mg/Ml  IV   0 mcg/kg/min





  TITR ANEL   0 mls/hr





    Titration





  Protocol  





  5 MCG/KG/MIN  


 


Levofloxacin/Dextrose  750 mg in 150 mls @ 100 mls/hr  04/19/21 14:00  04/19/21 

13:43





  Levaquin 750mg/150ml  IV  04/22/21 15:29  100 mls/hr





  Q24H ANEL   Administration





  Protocol  


 


Insulin Human Regular  0 units  04/18/21 00:00  04/19/21 18:46





  Insulin Regular, Human 100 Units/1 Ml  SUB-Q   2 units





  Q6H ANEL   Administration





  Protocol  


 


Multi-Ingred Cream/Lotion/Oil/Oint  1 applic  04/16/21 14:00 





  Mineral Oil/Petrolatum, White Ophth Oint 3.5 Gm  OU  





  Q4HR PRN  





  Dry Eye(s)  


 


Simple Syrup  15 ml  04/18/21 11:10 





  Simple Syrup 15 Ml  FEEDTUBE  





  PRN PRN  





  Hypoglycemia  


 


Simple Syrup  30 ml  04/18/21 11:10 





  Simple Syrup 15 Ml  FEEDTUBE  





  PRN PRN  





  Hypoglycemia  


 


Sodium Bicarbonate  325 mg  04/18/21 11:10 





  Sodium Bicarbonate 325 Mg Tab  FEEDTUBE  





  PRN PRN  





  For Clogged Feeding Tube  


 


Sodium Chloride  10 ml  04/16/21 22:00  04/19/21 22:15





  Sodium Chloride 0.9% 10 Ml Flush Syringe  IV   10 ml





  BID ANEL   Administration


 


Sodium Chloride  10 ml  04/16/21 17:04 





  Sodium Chloride 0.9% 10 Ml Flush Syringe  IV  





  PRN PRN  





  LINE FLUSH  














Nutrition/Malnutrition Assess





- Dietary Evaluation


Nutrition/Malnutrition Findings: 


                                        





Nutrition Notes                                            Start:  04/17/21 

08:22


Freq:                                                      Status: Active       

 


Protocol:                                                                       

 


 Document     04/18/21 11:12  LP  (Rec: 04/18/21 11:13  LP  [id number])


 Nutrition Notes


     Need for Assessment generated from:         MD Order


     Initial or Follow up                        Brief Note


     Subjective/Other Information                Consult for TF.


 Nutrition Intervention


     Nutrition Support:                          Promote at 60ml/hr


                                                 Flush with 50ml q6h


     Kcal                                        1,440


     Protein (gm)                                90


     Fluid (mL)                                  1,208


     Goal #1                                     Meet at least 80% of kcal and


                                                 protein needs


     Anticipated Discharge Needs:                Unable to determine at this


                                                 time


     Follow-Up By:                               04/20/21


     Additional Comments                         Follow for TF start/tolerance

## 2021-04-19 NOTE — PROGRESS NOTE
Assessment and Plan








Acute hypoxemic respiratory failure, on mechanical ventilatory support.


Acute encephalopathy, possibly on chronic.


Acute cerebrovascular accident, subacute.


Metastatic pulmonary nodules, possibly lung cancer.


Hyponatremia.


Elevated serum creatine kinase





- fevers to 102F (? Cerebral)


- treat with empiric CAP coverage X 5 days


- daughter to visit later


- dopplers negative for VTE


- get EEG and address


- neurology evaluation ongoing


- continue care as below otherwise;





- continue to wean supplemental oxygen for target O2 sat's > 92% acutely


- VAP bundle addressed


- continue lung protective strategies


- continue bronchodilators with pulmonary hygiene per RT


- wean per pulmonary driven protocols otherwise


- continue Daily SAT and SBT assessment as tolerated 


- continue accuchecks with glycemic control per SSI (While critically ill target

blood glucose of 140-180 mg/dL; avoid hypoglycemia)


- sedation prn for target RASS 0 to -1


- avoid nephrotoxins, renally dose all medications


- continue to avoid benzodiazepine's, reduce the possibility of delirium


- AB's per ID rec's 


- prn analgesia per CPOT score


- Maintenance of sleep-wake cycle, avoid delirium


- enteral nutritional support at goal rate as tolerated


- G.I. & VTE prophylaxis


- PT/OT/ROM exercises


- continue mobility protocols for pressure ulcer prophylaxis


- Monitor hemodynamics closely


- continue other care per attending / other consultants


- discharge planning ongoing concurrently





COVID SPECIFIC INTERVENTIONS


- Remdesivir as per ID/Pulmonary developed protocols


- systemic steroids for severe COVID-19 infection empirically


- follow repeat COVID tests results


- zinc and vitamin C supplementation


- Monitor inflammatory markers per facility protocol - ferritin, Ddimer, CRP


- therapeutic anticoagulation per system Protocol based on d-dimer and clinical 

considerations


- Continue contact and airborne isolation 





.... Re-evaluate in am & prn





CONDITION: CRITICAL


PROGNOSIS: GUARDED


CODE STATUS: FULL CODE





The high probability of a clinically significant, sudden or life-threatening 

deterioration of the [respiratory, cardiovascular & neurologic] system(s) 

required my full and direct attention, intervention and personal management. The

aggregate critical care time was [36] minutes without overlap. Time includes 

spent on;





[x] Data Review and interpretation 





[x] Patient assessment and monitoring of vital signs 





[x] Documentation 





[x] Medication orders and management











Subjective


Date of service: 04/19/21


Principal diagnosis: Ac. hypoxemic resp failure; Ac. encephalopathy; Acute CVA; 

Metast. lung CA


Interval history: 





Patient is seen today for: Acute hypoxemic respiratory failure; Acute ence

phalopathy; Acute cerebrovascular accident; Metastatic pulmonary nodules (lung 

cancer per daughter)





Seen and examined at bedside; 24hour events reviewed; nursing and respiratory 

care staff consulted; no adverse overnight events reported to me; resting 

peacefully in bed; remains on MVS; AMS is persistent; febrile up to 102F; no 

emesis or overt aspiration





Objective


                               Vital Signs - 12hr











  04/19/21 04/19/21 04/19/21





  03:00 03:20 04:00


 


Temperature   99.8 F H


 


Pulse Rate 97 H 99 H 106 H


 


Pulse Rate [   





From Monitor]   


 


Respiratory 18  21





Rate   


 


Blood Pressure 134/62 134/62 125/67


 


O2 Sat by Pulse  97 98





Oximetry   














  04/19/21 04/19/21 04/19/21





  05:00 06:00 07:00


 


Temperature   


 


Pulse Rate 105 H 106 H 107 H


 


Pulse Rate [   





From Monitor]   


 


Respiratory 21 20 22





Rate   


 


Blood Pressure 115/62 122/63 117/63


 


O2 Sat by Pulse 94 96 94





Oximetry   














  04/19/21 04/19/21 04/19/21





  07:52 08:00 09:00


 


Temperature  102.1 F H 


 


Pulse Rate 111 H 113 H 100 H


 


Pulse Rate [  113 H 





From Monitor]   


 


Respiratory  23 21





Rate   


 


Blood Pressure 117/63 135/72 115/57


 


O2 Sat by Pulse 97 95 95





Oximetry   














  04/19/21 04/19/21 04/19/21





  10:00 11:00 12:00


 


Temperature   100.7 F H


 


Pulse Rate 95 H 90 94 H


 


Pulse Rate [   91 H





From Monitor]   


 


Respiratory 20 19 19





Rate   


 


Blood Pressure 121/56 121/56 121/62


 


O2 Sat by Pulse 95 97 96





Oximetry   














  04/19/21 04/19/21





  12:31 13:00


 


Temperature  


 


Pulse Rate 91 H 90


 


Pulse Rate [  





From Monitor]  


 


Respiratory 19 21





Rate  


 


Blood Pressure 121/62 130/63


 


O2 Sat by Pulse 97 97





Oximetry  











Constitutional: other (elderly female with mildly increased respiratory effort 

at rest on MVS)


Eyes: non-icteric


ENT: oropharynx moist, other (ETT 23 cm RUBEN)


Neck: supple, no JVD


Effort: mildly labored


Ascultation: Bilateral: diminished breath sounds, rhonchi


Percussion: Bilateral: not dull


Cardiovascular: regular rate and rhythm


Gastrointestinal: normoactive bowel sounds, soft, non-tender, non-distended


Integumentary: normal


Extremities: no cyanosis, no edema, pulses normal, no ischemia or petechiae


Neurologic: pupils equal and round, unable to assess


Psychiatric: other (unable to assess re: AMS)


CBC and BMP: 


                                 04/17/21 04:19





                                 04/17/21 04:19


ABG, PT/INR, D-dimer: 


ABG











ABG pH  7.503  (7.320-7.450)  H  04/19/21  03:42    


 


POC ABG pCO2  34.2 mmHg (32.0-48.0)   04/19/21  03:42    


 


POC ABG pO2  63.5 mmHg ()  L  04/19/21  03:42    


 


POC ABG HCO3  26.3   04/19/21  03:42    


 


ABG O2 Saturation  94.4  (0-100)   04/19/21  03:42    





PT/INR, D-dimer











PT  12.8 Sec. (12.2-14.9)   04/16/21  14:06    


 


INR  0.98  (0.87-1.13)   04/16/21  14:06    








Abnormal lab findings: 


                                  Abnormal Labs











  04/16/21 04/16/21 04/16/21





  14:00 14:06 14:06


 


RBC   5.27 H 


 


Hgb   14.7 H 


 


Hct   43.7 H 


 


MCH   


 


RDW   15.8 H 


 


Lymph % (Auto)   6.2 L 


 


Lymph # (Auto)   0.7 L 


 


Seg Neutrophils %   89.2 H 


 


Seg Neutrophils #   9.5 H 


 


Thrombin Time    19.8 H


 


ABG pH   


 


POC ABG pCO2   


 


POC ABG pO2   


 


ABG Oxyhemoglobin   


 


ABG Sodium   


 


ABG Glucose   


 


Sodium   


 


Chloride   


 


Glucose   


 


POC Glucose  150 H  


 


Calcium   


 


Phosphorus   


 


Total Creatine Kinase   


 


C-Reactive Protein   


 


Total Protein   


 


Albumin   


 


Arterial Blood Glucose   


 


Arterial Blood Ionized Calcium   


 


Coronavirus (PCR)   














  04/16/21 04/16/21 04/17/21





  14:06 14:42 04:07


 


RBC   


 


Hgb   


 


Hct   


 


MCH   


 


RDW   


 


Lymph % (Auto)   


 


Lymph # (Auto)   


 


Seg Neutrophils %   


 


Seg Neutrophils #   


 


Thrombin Time   


 


ABG pH   


 


POC ABG pCO2   


 


POC ABG pO2   71.3 L 


 


ABG Oxyhemoglobin   92.6 L 


 


ABG Sodium   


 


ABG Glucose   118 H 


 


Sodium  129 L  


 


Chloride  93 L  


 


Glucose  155 H  


 


POC Glucose    50 L


 


Calcium   


 


Phosphorus   


 


Total Creatine Kinase  185 H  


 


C-Reactive Protein   


 


Total Protein  10.4 H  


 


Albumin  3.7 L  


 


Arterial Blood Glucose   118 H 


 


Arterial Blood Ionized Calcium   4.4 L 


 


Coronavirus (PCR)   














  04/17/21 04/17/21 04/17/21





  04:19 04:19 05:28


 


RBC   


 


Hgb   


 


Hct   


 


MCH  26 L  


 


RDW  15.9 H  


 


Lymph % (Auto)  8.1 L  


 


Lymph # (Auto)  0.8 L  


 


Seg Neutrophils %  84.8 H  


 


Seg Neutrophils #  8.3 H  


 


Thrombin Time   


 


ABG pH   


 


POC ABG pCO2   


 


POC ABG pO2   


 


ABG Oxyhemoglobin   


 


ABG Sodium   


 


ABG Glucose   


 


Sodium   


 


Chloride   


 


Glucose   53 L 


 


POC Glucose    156 H


 


Calcium   8.1 L 


 


Phosphorus   


 


Total Creatine Kinase   


 


C-Reactive Protein   


 


Total Protein   


 


Albumin   2.7 L 


 


Arterial Blood Glucose   


 


Arterial Blood Ionized Calcium   


 


Coronavirus (PCR)   














  04/17/21 04/17/21 04/17/21





  06:20 11:43 15:39


 


RBC   


 


Hgb   


 


Hct   


 


MCH   


 


RDW   


 


Lymph % (Auto)   


 


Lymph # (Auto)   


 


Seg Neutrophils %   


 


Seg Neutrophils #   


 


Thrombin Time   


 


ABG pH  7.486 H  


 


POC ABG pCO2  31.5 L  


 


POC ABG pO2   


 


ABG Oxyhemoglobin   


 


ABG Sodium   


 


ABG Glucose  58 L  


 


Sodium   


 


Chloride   


 


Glucose   


 


POC Glucose   180 H 


 


Calcium   


 


Phosphorus    1.90 L


 


Total Creatine Kinase   


 


C-Reactive Protein    4.80 H


 


Total Protein   


 


Albumin   


 


Arterial Blood Glucose  58 L  


 


Arterial Blood Ionized Calcium   


 


Coronavirus (PCR)   














  04/17/21 04/17/21 04/17/21





  17:01 23:24 Unknown


 


RBC   


 


Hgb   


 


Hct   


 


MCH   


 


RDW   


 


Lymph % (Auto)   


 


Lymph # (Auto)   


 


Seg Neutrophils %   


 


Seg Neutrophils #   


 


Thrombin Time   


 


ABG pH   


 


POC ABG pCO2   


 


POC ABG pO2   


 


ABG Oxyhemoglobin   


 


ABG Sodium   


 


ABG Glucose   


 


Sodium   


 


Chloride   


 


Glucose   


 


POC Glucose  227 H  256 H 


 


Calcium   


 


Phosphorus   


 


Total Creatine Kinase   


 


C-Reactive Protein   


 


Total Protein   


 


Albumin   


 


Arterial Blood Glucose   


 


Arterial Blood Ionized Calcium   


 


Coronavirus (PCR)    Positive A














  04/18/21 04/18/21 04/18/21





  03:58 05:13 12:02


 


RBC   


 


Hgb   


 


Hct   


 


MCH   


 


RDW   


 


Lymph % (Auto)   


 


Lymph # (Auto)   


 


Seg Neutrophils %   


 


Seg Neutrophils #   


 


Thrombin Time   


 


ABG pH  7.525 H  


 


POC ABG pCO2   


 


POC ABG pO2   


 


ABG Oxyhemoglobin   


 


ABG Sodium  132.4 L  


 


ABG Glucose  204 H  


 


Sodium   


 


Chloride   


 


Glucose   


 


POC Glucose   190 H  218 H


 


Calcium   


 


Phosphorus   


 


Total Creatine Kinase   


 


C-Reactive Protein   


 


Total Protein   


 


Albumin   


 


Arterial Blood Glucose  204 H  


 


Arterial Blood Ionized Calcium   


 


Coronavirus (PCR)   














  04/18/21 04/18/21 04/19/21





  17:05 23:35 03:42


 


RBC   


 


Hgb   


 


Hct   


 


MCH   


 


RDW   


 


Lymph % (Auto)   


 


Lymph # (Auto)   


 


Seg Neutrophils %   


 


Seg Neutrophils #   


 


Thrombin Time   


 


ABG pH    7.503 H


 


POC ABG pCO2   


 


POC ABG pO2    63.5 L


 


ABG Oxyhemoglobin   


 


ABG Sodium    128.6 L


 


ABG Glucose    227 H


 


Sodium   


 


Chloride   


 


Glucose   


 


POC Glucose  224 H  237 H 


 


Calcium   


 


Phosphorus   


 


Total Creatine Kinase   


 


C-Reactive Protein   


 


Total Protein   


 


Albumin   


 


Arterial Blood Glucose    227 H


 


Arterial Blood Ionized Calcium   


 


Coronavirus (PCR)   














  04/19/21





  05:23


 


RBC 


 


Hgb 


 


Hct 


 


MCH 


 


RDW 


 


Lymph % (Auto) 


 


Lymph # (Auto) 


 


Seg Neutrophils % 


 


Seg Neutrophils # 


 


Thrombin Time 


 


ABG pH 


 


POC ABG pCO2 


 


POC ABG pO2 


 


ABG Oxyhemoglobin 


 


ABG Sodium 


 


ABG Glucose 


 


Sodium 


 


Chloride 


 


Glucose 


 


POC Glucose  221 H


 


Calcium 


 


Phosphorus 


 


Total Creatine Kinase 


 


C-Reactive Protein 


 


Total Protein 


 


Albumin 


 


Arterial Blood Glucose 


 


Arterial Blood Ionized Calcium 


 


Coronavirus (PCR) 











Chest x-ray: image reviewed


Allied health notes reviewed: nursing

## 2021-04-19 NOTE — ELECTROCARDIOGRAPH REPORT
[facility]

                                       

Test Date:    2021               Test Time:    20:36:07

Pat Name:     JACKSON ECHOLS           Department:   

Patient ID:   [id number]          Room:         A259 1

Gender:       F                        Technician:   [email or web address]

:          1950               Requested By: KEREN GOMEZ

Order Number: [id number]              Reading MD:   Heidi Camacho

                                 Measurements

Intervals                              Axis          

Rate:         83                       P:            60

RI:           152                      QRS:          36

QRSD:         79                       T:            61

QT:           399                                    

QTc:          468                                    

                           Interpretive Statements

Sinus rhythm

Low voltage, extremity leads

Consider old anterior infarct

No previous ECG available for comparison

Electronically Signed On 2021 13:34:01 EDT by Heidi Camacho

## 2021-04-20 LAB
BUN SERPL-MCNC: 16 MG/DL (ref 7–17)
BUN/CREAT SERPL: 27 %
CALCIUM SERPL-MCNC: 8.1 MG/DL (ref 8.4–10.2)
HEMOLYSIS INDEX: 17

## 2021-04-20 RX ADMIN — INSULIN HUMAN SCH: 100 INJECTION, SOLUTION PARENTERAL at 13:51

## 2021-04-20 RX ADMIN — INSULIN HUMAN SCH UNITS: 100 INJECTION, SOLUTION PARENTERAL at 05:27

## 2021-04-20 RX ADMIN — FAMOTIDINE SCH MG: 20 TABLET ORAL at 10:03

## 2021-04-20 RX ADMIN — INSULIN HUMAN SCH: 100 INJECTION, SOLUTION PARENTERAL at 19:00

## 2021-04-20 RX ADMIN — ACETAMINOPHEN PRN MG: 325 SUSPENSION ORAL at 21:50

## 2021-04-20 RX ADMIN — Medication SCH ML: at 21:51

## 2021-04-20 RX ADMIN — Medication SCH ML: at 10:16

## 2021-04-20 RX ADMIN — Medication SCH ML: at 10:02

## 2021-04-20 RX ADMIN — FAMOTIDINE SCH MG: 20 TABLET ORAL at 21:50

## 2021-04-20 RX ADMIN — ENOXAPARIN SODIUM SCH MG: 100 INJECTION SUBCUTANEOUS at 21:50

## 2021-04-20 RX ADMIN — INSULIN HUMAN SCH UNITS: 100 INJECTION, SOLUTION PARENTERAL at 00:15

## 2021-04-20 NOTE — PROGRESS NOTE
Assessment and Plan








Acute hypoxemic respiratory failure, on mechanical ventilatory support.


Acute encephalopathy, possibly on chronic.


Acute cerebrovascular accident, subacute.


Metastatic pulmonary nodules, possibly lung cancer.


Hyponatremia.


Elevated serum creatine kinase





- get 2 hour ABG on SBT and extubate if acceptable


- prn BIPAP post extubation 


- afebrile today


- continue care as below otherwise;





- complete empiric CAP coverage X 5 days


- follow EEG and address


- neurology evaluation ongoing


- continue care as below otherwise;





- continue to wean supplemental oxygen for target O2 sat's > 92% acutely


- VAP bundle addressed


- continue lung protective strategies


- continue bronchodilators with pulmonary hygiene per RT


- wean per pulmonary driven protocols otherwise


- continue Daily SAT and SBT assessment as tolerated 


- continue accuchecks with glycemic control per SSI (While critically ill target

blood glucose of 140-180 mg/dL; avoid hypoglycemia)


- sedation prn for target RASS 0 to -1


- avoid nephrotoxins, renally dose all medications


- continue to avoid benzodiazepine's, reduce the possibility of delirium


- AB's per ID rec's 


- prn analgesia per CPOT score


- Maintenance of sleep-wake cycle, avoid delirium


- enteral nutritional support at goal rate as tolerated


- G.I. & VTE prophylaxis


- PT/OT/ROM exercises


- continue mobility protocols for pressure ulcer prophylaxis


- Monitor hemodynamics closely


- continue other care per attending / other consultants


- discharge planning ongoing concurrently





COVID SPECIFIC INTERVENTIONS


- Remdesivir as per ID/Pulmonary developed protocols


- systemic steroids for severe COVID-19 infection empirically


- follow repeat COVID tests results


- zinc and vitamin C supplementation


- Monitor inflammatory markers per facility protocol - ferritin, Ddimer, CRP


- therapeutic anticoagulation per system Protocol based on d-dimer and clinical 

considerations


- Continue contact and airborne isolation 





.... Re-evaluate in am & prn





CONDITION: CRITICAL


PROGNOSIS: GUARDED


CODE STATUS: FULL CODE





The high probability of a clinically significant, sudden or life-threatening 

deterioration of the [respiratory, cardiovascular & neurologic] system(s) 

required my full and direct attention, intervention and personal management. The

aggregate critical care time was [32] minutes without overlap. Time includes 

spent on;





[x] Data Review and interpretation 





[x] Patient assessment and monitoring of vital signs 





[x] Documentation 





[x] Medication orders and management














Subjective


Date of service: 04/20/21


Principal diagnosis: Ac. hypoxemic resp failure; Ac. encephalopathy; Acute CVA; 

Metast. lung CA


Interval history: 





Patient is seen today for: Acute hypoxemic respiratory failure; Acute 

encephalopathy; Acute cerebrovascular accident; Metastatic pulmonary nodules 

(lung cancer per daughter)





Seen and examined at bedside; 24hour events reviewed; nursing and respiratory 

care staff consulted; no adverse overnight events reported to me; resting 

peacefully in bed; tolerating SBT very well today; attempts to open eyes to name

 calling; secretions good; no emesis or overt aspiration





Objective


                               Vital Signs - 12hr











  04/20/21 04/20/21 04/20/21





  03:00 03:30 03:39


 


Temperature   98.8 F


 


Pulse Rate 88  


 


Pulse Rate [  99 H 





From Monitor]   


 


Respiratory 21 19 





Rate   


 


Blood Pressure 120/51  


 


O2 Sat by Pulse 95 95 





Oximetry   














  04/20/21 04/20/21 04/20/21





  04:00 05:00 05:35


 


Temperature   


 


Pulse Rate 89 90 87


 


Pulse Rate [   





From Monitor]   


 


Respiratory 19 20 19





Rate   


 


Blood Pressure 105/57 126/64 


 


O2 Sat by Pulse  97 95





Oximetry   














  04/20/21 04/20/21 04/20/21





  06:00 07:00 07:31


 


Temperature   


 


Pulse Rate 83 89 83


 


Pulse Rate [   





From Monitor]   


 


Respiratory 18 19 14





Rate   


 


Blood Pressure 116/54 129/69 129/69


 


O2 Sat by Pulse 97 96 97





Oximetry   














  04/20/21 04/20/21 04/20/21





  07:52 08:00 08:01


 


Temperature  99.8 F H 


 


Pulse Rate 88  87


 


Pulse Rate [   





From Monitor]   


 


Respiratory   23





Rate   


 


Blood Pressure 129/69  142/67


 


O2 Sat by Pulse 97  96





Oximetry   














  04/20/21 04/20/21 04/20/21





  08:31 09:00 09:31


 


Temperature   


 


Pulse Rate 83 86 84


 


Pulse Rate [   





From Monitor]   


 


Respiratory 20 19 17





Rate   


 


Blood Pressure 142/67 118/61 142/67


 


O2 Sat by Pulse 96 96 96





Oximetry   














  04/20/21 04/20/21 04/20/21





  10:00 11:16 12:00


 


Temperature   99.8 F H


 


Pulse Rate 88 87 


 


Pulse Rate [   





From Monitor]   


 


Respiratory 21 19 





Rate   


 


Blood Pressure 131/63 117/56 


 


O2 Sat by Pulse 97 96 





Oximetry   














  04/20/21





  13:26


 


Temperature 


 


Pulse Rate 


 


Pulse Rate [ 





From Monitor] 


 


Respiratory 





Rate 


 


Blood Pressure 


 


O2 Sat by Pulse 93





Oximetry 











Constitutional: other (elderly female withoutincreased respiratory effort at 

rest on MVS)


Eyes: non-icteric


ENT: oropharynx moist, other (ETT 23 cm RUBEN)


Neck: supple, no JVD


Effort: mildly labored


Ascultation: Bilateral: diminished breath sounds, rhonchi (scant)


Percussion: Bilateral: not dull


Cardiovascular: regular rate and rhythm


Gastrointestinal: normoactive bowel sounds, soft, non-tender, non-distended


Integumentary: normal


Extremities: no cyanosis, no edema, pulses normal, no ischemia or petechiae


Neurologic: pupils equal and round, unable to assess


Psychiatric: other (unable to assess re: AMS)


CBC and BMP: 


                                 04/17/21 04:19





                                 04/17/21 04:19


ABG, PT/INR, D-dimer: 


ABG











ABG pH  7.486  (7.320-7.450)  H  04/20/21  04:00    


 


POC ABG pCO2  36.9 mmHg (32.0-48.0)   04/20/21  04:00    


 


POC ABG pO2  76.3 mmHg ()  L  04/20/21  04:00    


 


POC ABG HCO3  27.2   04/20/21  04:00    


 


ABG O2 Saturation  96.3  (0-100)   04/20/21  04:00    





PT/INR, D-dimer











PT  12.8 Sec. (12.2-14.9)   04/16/21  14:06    


 


INR  0.98  (0.87-1.13)   04/16/21  14:06    








Abnormal lab findings: 


                                  Abnormal Labs











  04/16/21 04/16/21 04/16/21





  14:00 14:06 14:06


 


RBC   5.27 H 


 


Hgb   14.7 H 


 


Hct   43.7 H 


 


MCH   


 


RDW   15.8 H 


 


Lymph % (Auto)   6.2 L 


 


Lymph # (Auto)   0.7 L 


 


Seg Neutrophils %   89.2 H 


 


Seg Neutrophils #   9.5 H 


 


Thrombin Time    19.8 H


 


ABG pH   


 


POC ABG pCO2   


 


POC ABG pO2   


 


ABG Oxyhemoglobin   


 


ABG Sodium   


 


ABG Glucose   


 


Sodium   


 


Chloride   


 


Glucose   


 


POC Glucose  150 H  


 


Calcium   


 


Phosphorus   


 


Total Creatine Kinase   


 


C-Reactive Protein   


 


Total Protein   


 


Albumin   


 


Arterial Blood Glucose   


 


Arterial Blood Ionized Calcium   


 


Coronavirus (PCR)   














  04/16/21 04/16/21 04/17/21





  14:06 14:42 04:07


 


RBC   


 


Hgb   


 


Hct   


 


MCH   


 


RDW   


 


Lymph % (Auto)   


 


Lymph # (Auto)   


 


Seg Neutrophils %   


 


Seg Neutrophils #   


 


Thrombin Time   


 


ABG pH   


 


POC ABG pCO2   


 


POC ABG pO2   71.3 L 


 


ABG Oxyhemoglobin   92.6 L 


 


ABG Sodium   


 


ABG Glucose   118 H 


 


Sodium  129 L  


 


Chloride  93 L  


 


Glucose  155 H  


 


POC Glucose    50 L


 


Calcium   


 


Phosphorus   


 


Total Creatine Kinase  185 H  


 


C-Reactive Protein   


 


Total Protein  10.4 H  


 


Albumin  3.7 L  


 


Arterial Blood Glucose   118 H 


 


Arterial Blood Ionized Calcium   4.4 L 


 


Coronavirus (PCR)   














  04/17/21 04/17/21 04/17/21





  04:19 04:19 05:28


 


RBC   


 


Hgb   


 


Hct   


 


MCH  26 L  


 


RDW  15.9 H  


 


Lymph % (Auto)  8.1 L  


 


Lymph # (Auto)  0.8 L  


 


Seg Neutrophils %  84.8 H  


 


Seg Neutrophils #  8.3 H  


 


Thrombin Time   


 


ABG pH   


 


POC ABG pCO2   


 


POC ABG pO2   


 


ABG Oxyhemoglobin   


 


ABG Sodium   


 


ABG Glucose   


 


Sodium   


 


Chloride   


 


Glucose   53 L 


 


POC Glucose    156 H


 


Calcium   8.1 L 


 


Phosphorus   


 


Total Creatine Kinase   


 


C-Reactive Protein   


 


Total Protein   


 


Albumin   2.7 L 


 


Arterial Blood Glucose   


 


Arterial Blood Ionized Calcium   


 


Coronavirus (PCR)   














  04/17/21 04/17/21 04/17/21





  06:20 11:43 15:39


 


RBC   


 


Hgb   


 


Hct   


 


MCH   


 


RDW   


 


Lymph % (Auto)   


 


Lymph # (Auto)   


 


Seg Neutrophils %   


 


Seg Neutrophils #   


 


Thrombin Time   


 


ABG pH  7.486 H  


 


POC ABG pCO2  31.5 L  


 


POC ABG pO2   


 


ABG Oxyhemoglobin   


 


ABG Sodium   


 


ABG Glucose  58 L  


 


Sodium   


 


Chloride   


 


Glucose   


 


POC Glucose   180 H 


 


Calcium   


 


Phosphorus    1.90 L


 


Total Creatine Kinase   


 


C-Reactive Protein    4.80 H


 


Total Protein   


 


Albumin   


 


Arterial Blood Glucose  58 L  


 


Arterial Blood Ionized Calcium   


 


Coronavirus (PCR)   














  04/17/21 04/17/21 04/17/21





  17:01 23:24 Unknown


 


RBC   


 


Hgb   


 


Hct   


 


MCH   


 


RDW   


 


Lymph % (Auto)   


 


Lymph # (Auto)   


 


Seg Neutrophils %   


 


Seg Neutrophils #   


 


Thrombin Time   


 


ABG pH   


 


POC ABG pCO2   


 


POC ABG pO2   


 


ABG Oxyhemoglobin   


 


ABG Sodium   


 


ABG Glucose   


 


Sodium   


 


Chloride   


 


Glucose   


 


POC Glucose  227 H  256 H 


 


Calcium   


 


Phosphorus   


 


Total Creatine Kinase   


 


C-Reactive Protein   


 


Total Protein   


 


Albumin   


 


Arterial Blood Glucose   


 


Arterial Blood Ionized Calcium   


 


Coronavirus (PCR)    Positive A














  04/18/21 04/18/21 04/18/21





  03:58 05:13 12:02


 


RBC   


 


Hgb   


 


Hct   


 


MCH   


 


RDW   


 


Lymph % (Auto)   


 


Lymph # (Auto)   


 


Seg Neutrophils %   


 


Seg Neutrophils #   


 


Thrombin Time   


 


ABG pH  7.525 H  


 


POC ABG pCO2   


 


POC ABG pO2   


 


ABG Oxyhemoglobin   


 


ABG Sodium  132.4 L  


 


ABG Glucose  204 H  


 


Sodium   


 


Chloride   


 


Glucose   


 


POC Glucose   190 H  218 H


 


Calcium   


 


Phosphorus   


 


Total Creatine Kinase   


 


C-Reactive Protein   


 


Total Protein   


 


Albumin   


 


Arterial Blood Glucose  204 H  


 


Arterial Blood Ionized Calcium   


 


Coronavirus (PCR)   














  04/18/21 04/18/21 04/19/21





  17:05 23:35 03:42


 


RBC   


 


Hgb   


 


Hct   


 


MCH   


 


RDW   


 


Lymph % (Auto)   


 


Lymph # (Auto)   


 


Seg Neutrophils %   


 


Seg Neutrophils #   


 


Thrombin Time   


 


ABG pH    7.503 H


 


POC ABG pCO2   


 


POC ABG pO2    63.5 L


 


ABG Oxyhemoglobin   


 


ABG Sodium    128.6 L


 


ABG Glucose    227 H


 


Sodium   


 


Chloride   


 


Glucose   


 


POC Glucose  224 H  237 H 


 


Calcium   


 


Phosphorus   


 


Total Creatine Kinase   


 


C-Reactive Protein   


 


Total Protein   


 


Albumin   


 


Arterial Blood Glucose    227 H


 


Arterial Blood Ionized Calcium   


 


Coronavirus (PCR)   














  04/19/21 04/19/21 04/19/21





  05:23 11:44 14:27


 


RBC   


 


Hgb   


 


Hct   


 


MCH   


 


RDW   


 


Lymph % (Auto)   


 


Lymph # (Auto)   


 


Seg Neutrophils %   


 


Seg Neutrophils #   


 


Thrombin Time   


 


ABG pH    7.481 H


 


POC ABG pCO2   


 


POC ABG pO2    69.1 L


 


ABG Oxyhemoglobin   


 


ABG Sodium    126.7 L


 


ABG Glucose    329 H


 


Sodium   


 


Chloride   


 


Glucose   


 


POC Glucose  221 H  240 H 


 


Calcium   


 


Phosphorus   


 


Total Creatine Kinase   


 


C-Reactive Protein   


 


Total Protein   


 


Albumin   


 


Arterial Blood Glucose    329 H


 


Arterial Blood Ionized Calcium   


 


Coronavirus (PCR)   














  04/19/21 04/19/21 04/20/21





  17:32 18:38 00:05


 


RBC   


 


Hgb   


 


Hct   


 


MCH   


 


RDW   


 


Lymph % (Auto)   


 


Lymph # (Auto)   


 


Seg Neutrophils %   


 


Seg Neutrophils #   


 


Thrombin Time   


 


ABG pH   


 


POC ABG pCO2   


 


POC ABG pO2   


 


ABG Oxyhemoglobin   


 


ABG Sodium   


 


ABG Glucose   


 


Sodium   


 


Chloride   


 


Glucose   


 


POC Glucose  221 H  196 H  202 H


 


Calcium   


 


Phosphorus   


 


Total Creatine Kinase   


 


C-Reactive Protein   


 


Total Protein   


 


Albumin   


 


Arterial Blood Glucose   


 


Arterial Blood Ionized Calcium   


 


Coronavirus (PCR)   














  04/20/21 04/20/21 04/20/21





  04:00 05:23 12:13


 


RBC   


 


Hgb   


 


Hct   


 


MCH   


 


RDW   


 


Lymph % (Auto)   


 


Lymph # (Auto)   


 


Seg Neutrophils %   


 


Seg Neutrophils #   


 


Thrombin Time   


 


ABG pH  7.486 H  


 


POC ABG pCO2   


 


POC ABG pO2  76.3 L  


 


ABG Oxyhemoglobin   


 


ABG Sodium  128.7 L  


 


ABG Glucose  205 H  


 


Sodium   


 


Chloride   


 


Glucose   


 


POC Glucose   177 H  161 H


 


Calcium   


 


Phosphorus   


 


Total Creatine Kinase   


 


C-Reactive Protein   


 


Total Protein   


 


Albumin   


 


Arterial Blood Glucose  205 H  


 


Arterial Blood Ionized Calcium   


 


Coronavirus (PCR)   











Chest x-ray: image reviewed


Allied health notes reviewed: nursing

## 2021-04-20 NOTE — XRAY REPORT
[facility] ONE VIEW



INDICATION / CLINICAL INFORMATION:

Confirm New placement of NGT..



COMPARISON:

None available.



FINDINGS:

Nasogastric tube is present with the tip superimposed over the expected position of the gastric antru
m



Signer Name: Juan Mahoney MD FACR 

Signed: 4/20/2021 3:31 PM

Workstation Name: [id number]

## 2021-04-20 NOTE — PROGRESS NOTE
<MIKHAILCHRISTYESTELLAJudy - Last Filed: 04/20/21 17:01>





Assessment and Plan


Assessment and plan: 


This is a 71-year-old female who is currently visiting her family in [facility] with 

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 

Metastatic Lung Neoplasm brought to the ER by EMS after she was found 

unresponsive by family in the morning.





Acute hypoxemic respiratory failure 


COVID-19 infection


Metastatic lung cancer


Possible acute CVA


Metabolic encephalopathy


Hyponatremia 


History of CVA with aphasia


Vascular dementia


Severe protein calorie malnutrition


Hyponatremia


Hypochloremia





-CCM, neurology, ID consulted, appreciate recommendations


-Frequent neuro checks


-EEG pending


-Droplet/Contact isolation


-4/20 extubated


-Daughter requesting for DNR and hospice service


-Continue D5 10 for now to prevent hypoglycemia, 


-abx for CAP coverage


-CM consult to aid in placement of hospice





DVT and GI prophylaxis: PPI, Lovenox, SCDS to BLE while in bed








The high probability of a clinically significant, sudden or life threatening 

deterioration of the [multi] system(s) required my full and direct attention, 

intervention and personal management. The aggregate critical care time was [35.]

minutes. This time is in addition to time spent performing reported procedures 

but includes the following: 





[x] Data Review and interpretation 





[x] Patient assessment and monitoring of vital signs 





[x] Documentation 





[x] Medication orders and management








History


Interval history: 


This is a 71-year-old female with CVA complicated by aphasia, vascular dementia,

 cerebral sclerosis, metastatic lung neoplasm who presented to the emergency 

department on 4/16 was found unresponsive by the family on 4/16.  Patient was 

found to have a focal neurological deficit with symptoms consistent with CVA, 

hypoxemia and inability to protect her airway the patient was intubated and 

placed on mechanical ventilation.  Patient was found to have acute 

encephalopathy, metastatic lung cancer.  Patient was admitted to the ICU as a 

COVID-19 PUI with consults to infectious disease, neurology and CCM.





4/17: Patient family requested a DNR and hospice placement





4/18: Infectious disease signed off, pending hospice placement





4/19: Patient was febrile to 102.1 this morning and she was started on 

antibiotic therapy for CAP coverage for 5 days.  At the time my examination 

patient was sedated on propofol on assist control tidal volume 450, rate 12, 

PEEP 6, 35% FiO2 and later she was trialed on CPAP 10/5 with a possibility to 

extubate.   is working on obtaining hospice placement per daughter's

 wishes.  However given COVID-19 positive status patient would not qualify for 

inpatient hospice care and will need home hospice.  EEG pending.  





4/20: No acute events reported overnight, patient was on a CPAP trial at the 

time of examination on 6/10 and [facility] plans to extubate the patient to BiPAP 

nightly as needed.  It was reported that patient had high residuals overnight 

however this morning she did not.  She is hyponatremic and hypochloremic.





Hospitalist Physical





- Constitutional


Vitals: 


                                        











Temp Pulse Resp BP Pulse Ox


 


 97.7 F   125 H  24   108/58   96 


 


 04/20/21 16:00  04/20/21 16:00  04/20/21 16:00  04/20/21 16:00  04/20/21 16:00











General appearance: Present: no acute distress, cachectic, other (Resting 

comfortably on mechanical ventilation)





- EENT


Eyes: Present: PERRL





- Neck


Neck: Present: normal ROM





- Respiratory


Respiratory effort: normal





- Cardiovascular


Rhythm: regular





- Extremities


Extremities: no ischemia, pulses intact, pulses symmetrical, No edema, normal 

temperature, normal color


Peripheral Pulses: within normal limits





- Abdominal


General gastrointestinal: soft, non-tender, non-distended





- Integumentary


Integumentary: Present: warm, dry





- Psychiatric


Psychiatric: other





- Neurologic


Neurologic: other (track/focus, do not follow commands)





HEART Score





- HEART Score


Troponin: 


                                        











Troponin T  0.010 ng/mL (0.00-0.029)   04/16/21  14:06    














Results





- Labs


CBC & Chem 7: 


                                 04/17/21 04:19





                                 04/20/21 14:35


Labs: 


                             Laboratory Last Values











WBC  9.6 K/mm3 (4.5-11.0)   04/17/21  04:19    


 


RBC  4.58 M/mm3 (3.65-5.03)   04/17/21  04:19    


 


Hgb  12.1 gm/dl (10.1-14.3)   04/17/21  04:19    


 


Hct  38.2 % (30.3-42.9)   04/17/21  04:19    


 


MCV  83 fl (79-97)   04/17/21  04:19    


 


MCH  26 pg (28-32)  L  04/17/21  04:19    


 


MCHC  32 % (30-34)   04/17/21  04:19    


 


RDW  15.9 % (13.2-15.2)  H  04/17/21  04:19    


 


Plt Count  297 K/mm3 (140-440)   04/17/21  04:19    


 


Lymph % (Auto)  8.1 % (13.4-35.0)  L  04/17/21  04:19    


 


Mono % (Auto)  7.0 % (0.0-7.3)   04/17/21  04:19    


 


Eos % (Auto)  0.0 % (0.0-4.3)   04/17/21  04:19    


 


Baso % (Auto)  0.1 % (0.0-1.8)   04/17/21  04:19    


 


Lymph # (Auto)  0.8 K/mm3 (1.2-5.4)  L  04/17/21  04:19    


 


Mono # (Auto)  0.7 K/mm3 (0.0-0.8)   04/17/21  04:19    


 


Eos # (Auto)  0.0 K/mm3 (0.0-0.4)   04/17/21  04:19    


 


Baso # (Auto)  0.0 K/mm3 (0.0-0.1)   04/17/21  04:19    


 


Add Manual Diff  Complete   04/17/21  04:19    


 


Seg Neutrophils %  84.8 % (40.0-70.0)  H  04/17/21  04:19    


 


Nucleated RBC %  Not Reportable   04/17/21  04:19    


 


Seg Neutrophils #  8.3 K/mm3 (1.8-7.7)  H  04/17/21  04:19    


 


WBC Morphology  Not Reportable   04/17/21  04:19    


 


Hypersegmented Neuts  Not Reportable   04/17/21  04:19    


 


Hyposegmented Neuts  Not Reportable   04/17/21  04:19    


 


Hypogranular Neuts  Not Reportable   04/17/21  04:19    


 


Smudge Cells  Not Reportable   04/17/21  04:19    


 


Toxic Granulation  Not Reportable   04/17/21  04:19    


 


Toxic Vacuolation  Not Reportable   04/17/21  04:19    


 


Dohle Bodies  Not Reportable   04/17/21  04:19    


 


Pelger-Huet Anomaly  Not Reportable   04/17/21  04:19    


 


Tiffanie Rods  Not Reportable   04/17/21  04:19    


 


Platelet Estimate  Not Reportable   04/17/21  04:19    


 


Clumped Platelets  Not Reportable   04/17/21  04:19    


 


Plt Clumps, EDTA  Not Reportable   04/17/21  04:19    


 


Large Platelets  Not Reportable   04/17/21  04:19    


 


Giant Platelets  Not Reportable   04/17/21  04:19    


 


Platelet Satelliting  Not Reportable   04/17/21  04:19    


 


Plt Morphology Comment  Not Reportable   04/17/21  04:19    


 


RBC Morphology  Not Reportable   04/17/21  04:19    


 


Dimorphic RBCs  Not Reportable   04/17/21  04:19    


 


Polychromasia  Not Reportable   04/17/21  04:19    


 


Hypochromasia  Not Reportable   04/17/21  04:19    


 


Poikilocytosis  Not Reportable   04/17/21  04:19    


 


Anisocytosis  Not Reportable   04/17/21  04:19    


 


Microcytosis  Not Reportable   04/17/21  04:19    


 


Macrocytosis  Not Reportable   04/17/21  04:19    


 


Spherocytes  Not Reportable   04/17/21  04:19    


 


Pappenheimer Bodies  Not Reportable   04/17/21  04:19    


 


Sickle Cells  Not Reportable   04/17/21  04:19    


 


Target Cells  Not Reportable   04/17/21  04:19    


 


Tear Drop Cells  Not Reportable   04/17/21  04:19    


 


Ovalocytes  Not Reportable   04/17/21  04:19    


 


Helmet Cells  Not Reportable   04/17/21  04:19    


 


[place]all[place] Bodies  Not Reportable   04/17/21  04:19    


 


Cabot Rings  Not Reportable   04/17/21  04:19    


 


Violette Cells  Not Reportable   04/17/21  04:19    


 


Bite Cells  Not Reportable   04/17/21  04:19    


 


Crenated Cell  Not Reportable   04/17/21  04:19    


 


Elliptocytes  Not Reportable   04/17/21  04:19    


 


Acanthocytes (Spur)  Not Reportable   04/17/21  04:19    


 


Rouleaux  Not Reportable   04/17/21  04:19    


 


Hemoglobin C Crystals  Not Reportable   04/17/21  04:19    


 


Schistocytes  Not Reportable   04/17/21  04:19    


 


Malaria parasites  Not Reportable   04/17/21  04:19    


 


Saleem Bodies  Not Reportable   04/17/21  04:19    


 


Hem Pathologist Commnt  Not Reportable   04/17/21  04:19    


 


PT  12.8 Sec. (12.2-14.9)   04/16/21  14:06    


 


INR  0.98  (0.87-1.13)   04/16/21  14:06    


 


APTT  26.0 Sec. (24.2-36.6)   04/16/21  14:06    


 


Thrombin Time  19.8 Sec. (15.1-19.6)  H  04/16/21  14:06    


 


ABG pH  7.496  (7.320-7.450)  H  04/20/21  14:30    


 


POC ABG pCO2  36.2 mmHg (32.0-48.0)   04/20/21  14:30    


 


POC ABG pO2  58.6 mmHg ()  L  04/20/21  14:30    


 


POC ABG HCO3  27.3   04/20/21  14:30    


 


ABG O2 Saturation  92.8  (0-100)   04/20/21  14:30    


 


POC ABG Base Excess  4.1   04/20/21  14:30    


 


ABG Hemoglobin  12.3  (12.0-17.5)   04/20/21  14:30    


 


ABG Oxyhemoglobin  91.6  (94-98)  L  04/20/21  14:30    


 


ABG Methemoglobin  0.3  (0.0-1.5)   04/20/21  14:30    


 


ABG Sodium  129.5 mmol/L (136.0-145.0)  L  04/20/21  14:30    


 


ABG Potassium  4.6 mmol/L (3.40-4.50)  H  04/20/21  14:30    


 


ABG Chloride  100.0 mmol/L ()   04/20/21  14:30    


 


ABG Glucose  165 mg/dL (65-95)  H  04/20/21  14:30    


 


Carboxyhemoglobin  1.0  (0.5-1.5)   04/20/21  14:30    


 


FiO2 %  40   04/20/21  14:30    


 


Sodium  133 mmol/L (137-145)  L  04/20/21  14:35    


 


Potassium  4.9 mmol/L (3.6-5.0)   04/20/21  14:35    


 


Chloride  96.5 mmol/L ()  L  04/20/21  14:35    


 


Carbon Dioxide  28 mmol/L (22-30)   04/20/21  14:35    


 


Anion Gap  13 mmol/L  04/20/21  14:35    


 


BUN  16 mg/dL (7-17)   04/20/21  14:35    


 


Creatinine  0.6 mg/dL (0.6-1.2)   04/20/21  14:35    


 


Estimated GFR  > 60 ml/min  04/20/21  14:35    


 


BUN/Creatinine Ratio  27 %  04/20/21  14:35    


 


Glucose  152 mg/dL ()  H  04/20/21  14:35    


 


POC Glucose  161 mg/dL ()  H  04/20/21  12:13    


 


Calcium  8.1 mg/dL (8.4-10.2)  L  04/20/21  14:35    


 


Phosphorus  2.70 mg/dL (2.5-4.5)   04/20/21  14:35    


 


Magnesium  2.00 mg/dL (1.7-2.3)   04/20/21  14:35    


 


Total Bilirubin  0.40 mg/dL (0.1-1.2)   04/17/21  04:19    


 


AST  31 units/L (5-40)   04/17/21  04:19    


 


ALT  12 units/L (7-56)   04/17/21  04:19    


 


Alkaline Phosphatase  59 units/L ()   04/17/21  04:19    


 


Ammonia  33.0 umol/L (25-60)   04/16/21  14:06    


 


Total Creatine Kinase  185 units/L ()  H  04/16/21  14:06    


 


CK-MB (CK-2)  1.1 ng/mL (0.0-4.0)   04/16/21  14:06    


 


CK-MB (CK-2) Rel Index  0.5  (0-4)   04/16/21  14:06    


 


Troponin T  0.010 ng/mL (0.00-0.029)   04/16/21  14:06    


 


C-Reactive Protein  4.80 mg/dL (0.00-1.30)  H  04/17/21  15:39    


 


Total Protein  7.5 g/dL (6.3-8.2)  D 04/17/21  04:19    


 


Albumin  2.7 g/dL (3.9-5)  L  04/17/21  04:19    


 


Albumin/Globulin Ratio  0.6 %  04/17/21  04:19    


 


Procalcitonin  0.81 ng/mL (<0.15)   04/17/21  15:39    


 


TSH  1.450 mlU/mL (0.270-4.200)   04/16/21  14:06    


 


Arterial Blood Glucose  165 mg/dL (65-95)  H  04/20/21  14:30    


 


Arterial Blood Ionized Calcium  4.7 mg/dL (4.6-5.3)   04/20/21  14:30    


 


Urine Color  Yellow  (Yellow)   04/16/21  16:01    


 


Urine Turbidity  Clear  (Clear)   04/16/21  16:01    


 


Urine pH  6.0  (5.0-7.0)   04/16/21  16:01    


 


Ur Specific Gravity  1.015  (1.003-1.030)   04/16/21  16:01    


 


Urine Protein  30 mg/dl mg/dL (Negative)   04/16/21  16:01    


 


Urine Glucose (UA)  150 mg/dL (Negative)   04/16/21  16:01    


 


Urine Ketones  Neg mg/dL (Negative)   04/16/21  16:01    


 


Urine Blood  Neg  (Negative)   04/16/21  16:01    


 


Urine Nitrite  Neg  (Negative)   04/16/21  16:01    


 


Urine Bilirubin  Neg  (Negative)   04/16/21  16:01    


 


Urine Urobilinogen  < 2.0 mg/dL (<2.0)   04/16/21  16:01    


 


Ur Leukocyte Esterase  Neg  (Negative)   04/16/21  16:01    


 


Urine WBC (Auto)  1.0 /HPF (0.0-6.0)   04/16/21  16:01    


 


Urine RBC (Auto)  1.0 /HPF (0.0-6.0)   04/16/21  16:01    


 


U Epithel Cells (Auto)  < 1.0 /HPF (0-13.0)   04/16/21  16:01    


 


Urine Mucus  Few /HPF  04/16/21  16:01    


 


Urine Opiates Screen  Presumptive negative   04/16/21  14:48    


 


Urine Methadone Screen  Presumptive negative   04/16/21  14:48    


 


Ur Barbiturates Screen  Presumptive negative   04/16/21  14:48    


 


Ur Phencyclidine Scrn  Presumptive negative   04/16/21  14:48    


 


Ur Amphetamines Screen  Presumptive negative   04/16/21  14:48    


 


U Benzodiazepines Scrn  Presumptive negative   04/16/21  14:48    


 


Urine Cocaine Screen  Presumptive negative   04/16/21  14:48    


 


U Marijuana (THC) Screen  Presumptive negative   04/16/21  14:48    


 


Drugs of Abuse Note  Disclamer   04/16/21  14:48    


 


Plasma/Serum Alcohol  0.01 % (0-0.07)   04/16/21  14:06    


 


Coronavirus (PCR)  Positive  (Negative)  A  04/17/21  Unknown











[place]/IV: 


                                        





Voiding Method                   Indwelling Catheter











Active Medications





- Current Medications


Current Medications: 














Generic Name Dose Route Start Last Admin





  Trade Name Freq  PRN Reason Stop Dose Admin


 


Acetaminophen  650 mg  04/16/21 17:04  04/19/21 08:14





  Acetaminophen 325 Mg/10.15 Ml Oral Liqd Unit Dose  FEEDTUBE   650 mg





  Q6H PRN   Administration





  Pain MILD(1-3)/Fever >100.5/HA  


 


Albuterol  2.5 mg  04/16/21 17:04 





  Albuterol 2.5 Mg/3 Ml Nebu  IH  





  Q3HRT PRN  





  Shortness Of Breath  


 


Lipase/Protease/Amylase  1 each  04/18/21 11:10 





  Lipase 10,500/Protease 25,000/Amylase 43,750 (Units) Dr Tucker  FEEDTUBE  





  PRN PRN  





  For Clogged Feeding Tube  


 


Dextrose  0 ml  04/17/21 04:13  04/17/21 04:46





  Dextrose 50% In Water (25gm) 50 Ml Syringe  IV   20 ml





  Q30MIN PRN   Administration





  Hypoglycemia  





  Protocol  


 


Enoxaparin Sodium  40 mg  04/17/21 22:00  04/19/21 22:15





  Enoxaparin 40 Mg/0.4 Ml Inj  SUB-Q   40 mg





  QDAY@2200 ANEL   Administration





  Protocol  


 


Famotidine  20 mg  04/20/21 10:00  04/20/21 10:03





  Famotidine 20 Mg Tab  PO   20 mg





  BID ANEL   Administration


 


Hydrophilic Ointment  1 applic  04/16/21 14:00 





  Lip Therapy Vaseline  TP  





  Q2HR PRN  





  Dry Lips  


 


Propofol  1,000 mg in 100 mls @ 2.041 mls/hr  04/16/21 14:00  04/19/21 12:11





  Diprivan 10 Mg/Ml  IV   0 mcg/kg/min





  TITR ANEL   0 mls/hr





    Titration





  Protocol  





  5 MCG/KG/MIN  


 


Levofloxacin/Dextrose  750 mg in 150 mls @ 100 mls/hr  04/19/21 14:00  04/20/21 

13:53





  Levaquin 750mg/150ml  IV  04/22/21 15:29  100 mls/hr





  Q24H ANEL   Administration





  Protocol  


 


Dextrose/Sodium Chloride  1,000 mls @ 42 mls/hr  04/20/21 14:00 





  D5/0.45ns  IV  





  AS DIRECT ANEL  


 


Insulin Human Regular  0 units  04/18/21 00:00  04/20/21 13:51





  Insulin Regular, Human 100 Units/1 Ml  SUB-Q   Not Given





  Q6H ANEL  





  Protocol  


 


Multi-Ingred Cream/Lotion/Oil/Oint  1 applic  04/16/21 14:00 





  Mineral Oil/Petrolatum, White Ophth Oint 3.5 Gm  OU  





  Q4HR PRN  





  Dry Eye(s)  


 


Simple Syrup  15 ml  04/18/21 11:10 





  Simple Syrup 15 Ml  FEEDTUBE  





  PRN PRN  





  Hypoglycemia  


 


Simple Syrup  30 ml  04/18/21 11:10 





  Simple Syrup 15 Ml  FEEDTUBE  





  PRN PRN  





  Hypoglycemia  


 


Sodium Bicarbonate  325 mg  04/18/21 11:10 





  Sodium Bicarbonate 325 Mg Tab  FEEDTUBE  





  PRN PRN  





  For Clogged Feeding Tube  


 


Sodium Chloride  10 ml  04/16/21 22:00  04/20/21 10:16





  Sodium Chloride 0.9% 10 Ml Flush Syringe  IV   10 ml





  BID ANEL   Administration


 


Sodium Chloride  10 ml  04/16/21 17:04 





  Sodium Chloride 0.9% 10 Ml Flush Syringe  IV  





  PRN PRN  





  LINE FLUSH  














Nutrition/Malnutrition Assess





- Dietary Evaluation


Nutrition/Malnutrition Findings: 


                                        





Nutrition Notes                                            Start:  04/17/21 

08:22


Freq:                                                      Status: Active       

 


Protocol:                                                                       

 


 Document     04/20/21 11:30  CW  (Rec: 04/20/21 14:51  CW  [id number])


 Nutrition Notes


     Initial or Follow up                        Reassessment


     Current Diagnosis                           Respiratory Failure,Stroke


     Other Pertinent Diagnosis                   Lung CA with Mets, Aphasia,


                                                 Metabolic Encephalopathy


     Current Diet                                TF - Promote at 60 ml/hr


     Labs/Tests                                  


     Pertinent Medications                       Humulin


     Height                                      5 ft 9 in


     Weight                                      64 kg


     Ideal Body Weight (kg)                      65.90


     BMI                                         20.8


     Weight Status                               Appropriate


     Subjective/Other Information                F/U for TF tolerance and ONS.


                                                 Last night TF was no increased


                                                 to goal d/t residuals. TF


                                                 currently running at 45 ml/hr


                                                 and is being well tolerated as


                                                 of this morning. POC is to


                                                 extubated today


     Percent of energy/protein needs met:        74%/88%


     Burn                                        Absent


     Trauma                                      Absent


     GI Symptoms                                 None


     Current % PO                                Negligible


     Minimum of two criteria                     No physical signs of


                                                 malnutrition


     #1


      Nutrition Diagnosis                        Inadequate oral intake


      Diagnosis Progress(for reassessment        Continues


       documentation)                            


     Is patient on ventilator?                   Yes


     Is Patient Ambulatory and/or Out of Bed     No


     REE-([facility]-confined to bed)      [phone]


     Calculation Used for Recommendations        [facility]


     Additional Notes                            Protein needs are 77-128g (1.2


                                                 -2g/kg)


                                                 Fluid needs are 1ml/kcal


 Nutrition Intervention


     Change Diet Order:                          Continue TF or extubation


     Nutrition Support:                          Promote at 60ml/hr


                                                 Flush with 50ml q6h


     Kcal                                        1,440


     Protein (gm)                                90


     Fluid (mL)                                  1,208


     Goal #1                                     Meet at least 80% of kcal and


                                                 protein needs


     Anticipated Discharge Needs:                Unable to determine at this


                                                 time


     Follow-Up By:                               04/22/21


     Additional Comments                         F/U for vent status; TF


                                                 tolerance











<MAIKOL MALDONADO R - Last Filed: 04/20/21 23:14>





Assessment and Plan


Assessment and plan: 





I saw and evaluated the patient. I agree with the findings and the plan of care 

as documented in the Nurse Practitioner's~note, with the following corrections 

and additions.





--Extubated today, on N/C, resume TF, wait for hospice placement











Hospitalist Physical





- Constitutional


Vitals: 


                                        











Temp Pulse Resp BP Pulse Ox


 


 100.4 F H  123 H  23   108/58   97 


 


 04/20/21 19:57  04/20/21 22:00  04/20/21 22:00  04/20/21 22:00  04/20/21 22:00














HEART Score





- HEART Score


Troponin: 


                                        











Troponin T  0.010 ng/mL (0.00-0.029)   04/16/21  14:06    














Results





- Labs


CBC & Chem 7: 


                                 04/17/21 04:19





                                 04/20/21 14:35


Labs: 


                             Laboratory Last Values











WBC  9.6 K/mm3 (4.5-11.0)   04/17/21  04:19    


 


RBC  4.58 M/mm3 (3.65-5.03)   04/17/21  04:19    


 


Hgb  12.1 gm/dl (10.1-14.3)   04/17/21  04:19    


 


Hct  38.2 % (30.3-42.9)   04/17/21  04:19    


 


MCV  83 fl (79-97)   04/17/21  04:19    


 


MCH  26 pg (28-32)  L  04/17/21  04:19    


 


MCHC  32 % (30-34)   04/17/21  04:19    


 


RDW  15.9 % (13.2-15.2)  H  04/17/21  04:19    


 


Plt Count  297 K/mm3 (140-440)   04/17/21  04:19    


 


Lymph % (Auto)  8.1 % (13.4-35.0)  L  04/17/21  04:19    


 


Mono % (Auto)  7.0 % (0.0-7.3)   04/17/21  04:19    


 


Eos % (Auto)  0.0 % (0.0-4.3)   04/17/21  04:19    


 


Baso % (Auto)  0.1 % (0.0-1.8)   04/17/21  04:19    


 


Lymph # (Auto)  0.8 K/mm3 (1.2-5.4)  L  04/17/21  04:19    


 


Mono # (Auto)  0.7 K/mm3 (0.0-0.8)   04/17/21  04:19    


 


Eos # (Auto)  0.0 K/mm3 (0.0-0.4)   04/17/21  04:19    


 


Baso # (Auto)  0.0 K/mm3 (0.0-0.1)   04/17/21  04:19    


 


Add Manual Diff  Complete   04/17/21  04:19    


 


Seg Neutrophils %  84.8 % (40.0-70.0)  H  04/17/21  04:19    


 


Nucleated RBC %  Not Reportable   04/17/21  04:19    


 


Seg Neutrophils #  8.3 K/mm3 (1.8-7.7)  H  04/17/21  04:19    


 


WBC Morphology  Not Reportable   04/17/21  04:19    


 


Hypersegmented Neuts  Not Reportable   04/17/21  04:19    


 


Hyposegmented Neuts  Not Reportable   04/17/21  04:19    


 


Hypogranular Neuts  Not Reportable   04/17/21  04:19    


 


Smudge Cells  Not Reportable   04/17/21  04:19    


 


Toxic Granulation  Not Reportable   04/17/21  04:19    


 


Toxic Vacuolation  Not Reportable   04/17/21  04:19    


 


Dohle Bodies  Not Reportable   04/17/21  04:19    


 


Pelger-Huet Anomaly  Not Reportable   04/17/21  04:19    


 


Tiffanie Rods  Not Reportable   04/17/21  04:19    


 


Platelet Estimate  Not Reportable   04/17/21  04:19    


 


Clumped Platelets  Not Reportable   04/17/21  04:19    


 


Plt Clumps, EDTA  Not Reportable   04/17/21  04:19    


 


Large Platelets  Not Reportable   04/17/21  04:19    


 


Giant Platelets  Not Reportable   04/17/21  04:19    


 


Platelet Satelliting  Not Reportable   04/17/21  04:19    


 


Plt Morphology Comment  Not Reportable   04/17/21  04:19    


 


RBC Morphology  Not Reportable   04/17/21  04:19    


 


Dimorphic RBCs  Not Reportable   04/17/21  04:19    


 


Polychromasia  Not Reportable   04/17/21  04:19    


 


Hypochromasia  Not Reportable   04/17/21  04:19    


 


Poikilocytosis  Not Reportable   04/17/21  04:19    


 


Anisocytosis  Not Reportable   04/17/21  04:19    


 


Microcytosis  Not Reportable   04/17/21  04:19    


 


Macrocytosis  Not Reportable   04/17/21  04:19    


 


Spherocytes  Not Reportable   04/17/21  04:19    


 


Pappenheimer Bodies  Not Reportable   04/17/21  04:19    


 


Sickle Cells  Not Reportable   04/17/21  04:19    


 


Target Cells  Not Reportable   04/17/21  04:19    


 


Tear Drop Cells  Not Reportable   04/17/21  04:19    


 


Ovalocytes  Not Reportable   04/17/21  04:19    


 


Helmet Cells  Not Reportable   04/17/21  04:19    


 


[place] Bodies  Not Reportable   04/17/21  04:19    


 


Cabot Rings  Not Reportable   04/17/21  04:19    


 


Violette Cells  Not Reportable   04/17/21  04:19    


 


Bite Cells  Not Reportable   04/17/21  04:19    


 


Crenated Cell  Not Reportable   04/17/21  04:19    


 


Elliptocytes  Not Reportable   04/17/21  04:19    


 


Acanthocytes (Spur)  Not Reportable   04/17/21  04:19    


 


Rouleaux  Not Reportable   04/17/21  04:19    


 


Hemoglobin C Crystals  Not Reportable   04/17/21  04:19    


 


Schistocytes  Not Reportable   04/17/21  04:19    


 


Malaria parasites  Not Reportable   04/17/21  04:19    


 


Saleem Bodies  Not Reportable   04/17/21  04:19    


 


Hem Pathologist Commnt  Not Reportable   04/17/21  04:19    


 


PT  12.8 Sec. (12.2-14.9)   04/16/21  14:06    


 


INR  0.98  (0.87-1.13)   04/16/21  14:06    


 


APTT  26.0 Sec. (24.2-36.6)   04/16/21  14:06    


 


Thrombin Time  19.8 Sec. (15.1-19.6)  H  04/16/21  14:06    


 


ABG pH  7.496  (7.320-7.450)  H  04/20/21  14:30    


 


POC ABG pCO2  36.2 mmHg (32.0-48.0)   04/20/21  14:30    


 


POC ABG pO2  58.6 mmHg ()  L  04/20/21  14:30    


 


POC ABG HCO3  27.3   04/20/21  14:30    


 


ABG O2 Saturation  92.8  (0-100)   04/20/21  14:30    


 


POC ABG Base Excess  4.1   04/20/21  14:30    


 


ABG Hemoglobin  12.3  (12.0-17.5)   04/20/21  14:30    


 


ABG Oxyhemoglobin  91.6  (94-98)  L  04/20/21  14:30    


 


ABG Methemoglobin  0.3  (0.0-1.5)   04/20/21  14:30    


 


ABG Sodium  129.5 mmol/L (136.0-145.0)  L  04/20/21  14:30    


 


ABG Potassium  4.6 mmol/L (3.40-4.50)  H  04/20/21  14:30    


 


ABG Chloride  100.0 mmol/L ()   04/20/21  14:30    


 


ABG Glucose  165 mg/dL (65-95)  H  04/20/21  14:30    


 


Carboxyhemoglobin  1.0  (0.5-1.5)   04/20/21  14:30    


 


FiO2 %  40   04/20/21  14:30    


 


Sodium  133 mmol/L (137-145)  L  04/20/21  14:35    


 


Potassium  4.9 mmol/L (3.6-5.0)   04/20/21  14:35    


 


Chloride  96.5 mmol/L ()  L  04/20/21  14:35    


 


Carbon Dioxide  28 mmol/L (22-30)   04/20/21  14:35    


 


Anion Gap  13 mmol/L  04/20/21  14:35    


 


BUN  16 mg/dL (7-17)   04/20/21  14:35    


 


Creatinine  0.6 mg/dL (0.6-1.2)   04/20/21  14:35    


 


Estimated GFR  > 60 ml/min  04/20/21  14:35    


 


BUN/Creatinine Ratio  27 %  04/20/21  14:35    


 


Glucose  152 mg/dL ()  H  04/20/21  14:35    


 


POC Glucose  157 mg/dL ()  H  04/20/21  19:11    


 


Calcium  8.1 mg/dL (8.4-10.2)  L  04/20/21  14:35    


 


Phosphorus  2.70 mg/dL (2.5-4.5)   04/20/21  14:35    


 


Magnesium  2.00 mg/dL (1.7-2.3)   04/20/21  14:35    


 


Total Bilirubin  0.40 mg/dL (0.1-1.2)   04/17/21  04:19    


 


AST  31 units/L (5-40)   04/17/21  04:19    


 


ALT  12 units/L (7-56)   04/17/21  04:19    


 


Alkaline Phosphatase  59 units/L ()   04/17/21  04:19    


 


Ammonia  33.0 umol/L (25-60)   04/16/21  14:06    


 


Total Creatine Kinase  185 units/L ()  H  04/16/21  14:06    


 


CK-MB (CK-2)  1.1 ng/mL (0.0-4.0)   04/16/21  14:06    


 


CK-MB (CK-2) Rel Index  0.5  (0-4)   04/16/21  14:06    


 


Troponin T  0.010 ng/mL (0.00-0.029)   04/16/21  14:06    


 


C-Reactive Protein  4.80 mg/dL (0.00-1.30)  H  04/17/21  15:39    


 


Total Protein  7.5 g/dL (6.3-8.2)  D 04/17/21  04:19    


 


Albumin  2.7 g/dL (3.9-5)  L  04/17/21  04:19    


 


Albumin/Globulin Ratio  0.6 %  04/17/21  04:19    


 


Procalcitonin  0.81 ng/mL (<0.15)   04/17/21  15:39    


 


TSH  1.450 mlU/mL (0.270-4.200)   04/16/21  14:06    


 


Arterial Blood Glucose  165 mg/dL (65-95)  H  04/20/21  14:30    


 


Arterial Blood Ionized Calcium  4.7 mg/dL (4.6-5.3)   04/20/21  14:30    


 


Urine Color  Yellow  (Yellow)   04/16/21  16:01    


 


Urine Turbidity  Clear  (Clear)   04/16/21  16:01    


 


Urine pH  6.0  (5.0-7.0)   04/16/21  16:01    


 


Ur Specific Gravity  1.015  (1.003-1.030)   04/16/21  16:01    


 


Urine Protein  30 mg/dl mg/dL (Negative)   04/16/21  16:01    


 


Urine Glucose (UA)  150 mg/dL (Negative)   04/16/21  16:01    


 


Urine Ketones  Neg mg/dL (Negative)   04/16/21  16:01    


 


Urine Blood  Neg  (Negative)   04/16/21  16:01    


 


Urine Nitrite  Neg  (Negative)   04/16/21  16:01    


 


Urine Bilirubin  Neg  (Negative)   04/16/21  16:01    


 


Urine Urobilinogen  < 2.0 mg/dL (<2.0)   04/16/21  16:01    


 


Ur Leukocyte Esterase  Neg  (Negative)   04/16/21  16:01    


 


Urine WBC (Auto)  1.0 /HPF (0.0-6.0)   04/16/21  16:01    


 


Urine RBC (Auto)  1.0 /HPF (0.0-6.0)   04/16/21  16:01    


 


U Epithel Cells (Auto)  < 1.0 /HPF (0-13.0)   04/16/21  16:01    


 


Urine Mucus  Few /HPF  04/16/21  16:01    


 


Urine Opiates Screen  Presumptive negative   04/16/21  14:48    


 


Urine Methadone Screen  Presumptive negative   04/16/21  14:48    


 


Ur Barbiturates Screen  Presumptive negative   04/16/21  14:48    


 


Ur Phencyclidine Scrn  Presumptive negative   04/16/21  14:48    


 


Ur Amphetamines Screen  Presumptive negative   04/16/21  14:48    


 


U Benzodiazepines Scrn  Presumptive negative   04/16/21  14:48    


 


Urine Cocaine Screen  Presumptive negative   04/16/21  14:48    


 


U Marijuana (THC) Screen  Presumptive negative   04/16/21  14:48    


 


Drugs of Abuse Note  Disclamer   04/16/21  14:48    


 


Plasma/Serum Alcohol  0.01 % (0-0.07)   04/16/21  14:06    


 


Coronavirus (PCR)  Positive  (Negative)  A  04/17/21  Unknown











[place]/IV: 


                                        





Voiding Method                   External Female Catheter











Active Medications





- Current Medications


Current Medications: 














Generic Name Dose Route Start Last Admin





  Trade Name Freq  PRN Reason Stop Dose Admin


 


Acetaminophen  650 mg  04/16/21 17:04  04/20/21 21:50





  Acetaminophen 325 Mg/10.15 Ml Oral Liqd Unit Dose  FEEDTUBE   650 mg





  Q6H PRN   Administration





  Pain MILD(1-3)/Fever >100.5/HA  


 


Albuterol  2.5 mg  04/16/21 17:04 





  Albuterol 2.5 Mg/3 Ml Nebu  IH  





  Q3HRT PRN  





  Shortness Of Breath  


 


Lipase/Protease/Amylase  1 each  04/18/21 11:10 





  Lipase 10,500/Protease 25,000/Amylase 43,750 (Units) Dr Tucker  FEEDTUBE  





  PRN PRN  





  For Clogged Feeding Tube  


 


Dextrose  0 ml  04/17/21 04:13  04/17/21 04:46





  Dextrose 50% In Water (25gm) 50 Ml Syringe  IV   20 ml





  Q30MIN PRN   Administration





  Hypoglycemia  





  Protocol  


 


Enoxaparin Sodium  40 mg  04/17/21 22:00  04/20/21 21:50





  Enoxaparin 40 Mg/0.4 Ml Inj  SUB-Q   40 mg





  QDAY@2200 ANEL   Administration





  Protocol  


 


Famotidine  20 mg  04/20/21 10:00  04/20/21 21:50





  Famotidine 20 Mg Tab  PO   20 mg





  BID ANEL   Administration


 


Hydrophilic Ointment  1 applic  04/16/21 14:00 





  Lip Therapy Vaseline  TP  





  Q2HR PRN  





  Dry Lips  


 


Propofol  1,000 mg in 100 mls @ 2.041 mls/hr  04/16/21 14:00  04/19/21 12:11





  Diprivan 10 Mg/Ml  IV   0 mcg/kg/min





  TITR ANEL   0 mls/hr





    Titration





  Protocol  





  5 MCG/KG/MIN  


 


Levofloxacin/Dextrose  750 mg in 150 mls @ 100 mls/hr  04/19/21 14:00  04/20/21 

13:53





  Levaquin 750mg/150ml  IV  04/22/21 15:29  100 mls/hr





  Q24H ANEL   Administration





  Protocol  


 


Insulin Human Regular  0 units  04/18/21 00:00  04/20/21 19:00





  Insulin Regular, Human 100 Units/1 Ml  SUB-Q   Not Given





  Q6H ANEL  





  Protocol  


 


Multi-Ingred Cream/Lotion/Oil/Oint  1 applic  04/16/21 14:00 





  Mineral Oil/Petrolatum, White Ophth Oint 3.5 Gm  OU  





  Q4HR PRN  





  Dry Eye(s)  


 


Simple Syrup  15 ml  04/18/21 11:10 





  Simple Syrup 15 Ml  FEEDTUBE  





  PRN PRN  





  Hypoglycemia  


 


Simple Syrup  30 ml  04/18/21 11:10 





  Simple Syrup 15 Ml  FEEDTUBE  





  PRN PRN  





  Hypoglycemia  


 


Sodium Bicarbonate  325 mg  04/18/21 11:10 





  Sodium Bicarbonate 325 Mg Tab  FEEDTUBE  





  PRN PRN  





  For Clogged Feeding Tube  


 


Sodium Chloride  10 ml  04/16/21 22:00  04/20/21 21:51





  Sodium Chloride 0.9% 10 Ml Flush Syringe  IV   10 ml





  BID ANEL   Administration


 


Sodium Chloride  10 ml  04/16/21 17:04 





  Sodium Chloride 0.9% 10 Ml Flush Syringe  IV  





  PRN PRN  





  LINE FLUSH  














Nutrition/Malnutrition Assess





- Dietary Evaluation


Nutrition/Malnutrition Findings: 


                                        





Nutrition Notes                                            Start:  04/17/21 

08:22


Freq:                                                      Status: Active       

 


Protocol:                                                                       

 


 Document     04/20/21 11:30  CW  (Rec: 04/20/21 14:51  CW  [id number])


 Nutrition Notes


     Initial or Follow up                        Reassessment


     Current Diagnosis                           Respiratory Failure,Stroke


     Other Pertinent Diagnosis                   Lung CA with Mets, Aphasia,


                                                 Metabolic Encephalopathy


     Current Diet                                TF - Promote at 60 ml/hr


     Labs/Tests                                  


     Pertinent Medications                       Humulin


     Height                                      5 ft 9 in


     Weight                                      64 kg


     Ideal Body Weight (kg)                      65.90


     BMI                                         20.8


     Weight Status                               Appropriate


     Subjective/Other Information                F/U for TF tolerance and ONS.


                                                 Last night TF was no increased


                                                 to goal d/t residuals. TF


                                                 currently running at 45 ml/hr


                                                 and is being well tolerated as


                                                 of this morning. POC is to


                                                 extubated today


     Percent of energy/protein needs met:        74%/88%


     Burn                                        Absent


     Trauma                                      Absent


     GI Symptoms                                 None


     Current % PO                                Negligible


     Minimum of two criteria                     No physical signs of


                                                 malnutrition


     #1


      Nutrition Diagnosis                        Inadequate oral intake


      Diagnosis Progress(for reassessment        Continues


       documentation)                            


     Is patient on ventilator?                   Yes


     Is Patient Ambulatory and/or Out of Bed     No


     REE-([facility]-confined to bed)      [phone]


     Calculation Used for Recommendations        [facility]


     Additional Notes                            Protein needs are 77-128g (1.2


                                                 -2g/kg)


                                                 Fluid needs are 1ml/kcal


 Nutrition Intervention


     Change Diet Order:                          Continue TF or extubation


     Nutrition Support:                          Promote at 60ml/hr


                                                 Flush with 50ml q6h


     Kcal                                        1,440


     Protein (gm)                                90


     Fluid (mL)                                  1,208


     Goal #1                                     Meet at least 80% of kcal and


                                                 protein needs


     Anticipated Discharge Needs:                Unable to determine at this


                                                 time


     Follow-Up By:                               04/22/21


     Additional Comments                         F/U for vent status; TF


                                                 tolerance

## 2021-04-20 NOTE — XRAY REPORT
CHEST - 1 VIEW



INDICATION:  follow up respiratory failure 



COMPARISON:

Yesterday



FINDINGS:



SUPPORT DEVICES:  Stable support device positioning.



HEART:  Stable cardiomediastinal silhouette.



LUNGS/PLEURA:  Innumerable pulmonary nodules again noted, unchanged.



ADDITIONAL FINDINGS:  None.



IMPRESSION:  



Unchanged exam.



Signer Name: Nimesh Oviedo MD 

Signed: 4/20/2021 3:20 AM

Workstation Name: [id number]

## 2021-04-21 VITALS — SYSTOLIC BLOOD PRESSURE: 134 MMHG | DIASTOLIC BLOOD PRESSURE: 56 MMHG

## 2021-04-21 RX ADMIN — FAMOTIDINE SCH MG: 20 TABLET ORAL at 09:14

## 2021-04-21 RX ADMIN — INSULIN HUMAN SCH UNITS: 100 INJECTION, SOLUTION PARENTERAL at 12:23

## 2021-04-21 RX ADMIN — INSULIN HUMAN SCH UNITS: 100 INJECTION, SOLUTION PARENTERAL at 00:29

## 2021-04-21 RX ADMIN — Medication SCH ML: at 09:14

## 2021-04-21 RX ADMIN — ACETAMINOPHEN PRN MG: 325 SUSPENSION ORAL at 08:43

## 2021-04-21 RX ADMIN — ACETAMINOPHEN PRN MG: 325 SUSPENSION ORAL at 16:55

## 2021-04-21 RX ADMIN — INSULIN HUMAN SCH: 100 INJECTION, SOLUTION PARENTERAL at 05:50

## 2021-04-21 NOTE — PROGRESS NOTE
Assessment and Plan








Acute hypoxemic respiratory failure, on mechanical ventilatory support.


Acute encephalopathy, possibly on chronic.


Acute cerebrovascular accident, subacute.


Metastatic pulmonary nodules, possibly lung cancer.


Hyponatremia.


Elevated serum creatine kinase





- continue prn BIPAP  


- continue hospice evaluation


- transfer to medical floor


- continue care as below otherwise;





- complete empiric CAP coverage X 5 days with Levaquin


- follow EEG and address


- neurology evaluation ongoing


- continue to wean supplemental oxygen for target O2 sat's > 92% acutely


- aspiration precautions


- continue lung protective strategies


- prn bronchodilators with pulmonary hygiene per RT


- continue accuchecks with glycemic control per SSI for target blood glucose of 

< 180 mg/dL; avoid hypoglycemia


- avoid nephrotoxins, renally dose all medications


- continue to avoid benzodiazepine's, reduce the possibility of delirium


- prn analgesia per CPOT score


- Maintenance of sleep-wake cycle, avoid delirium


- enteral nutritional support at goal rate as tolerated


- G.I. & VTE prophylaxis


- PT/OT/ROM exercises


- continue mobility protocols for pressure ulcer prophylaxis


- Monitor hemodynamics closely


- continue other care per attending / other consultants


- discharge planning ongoing concurrently





COVID SPECIFIC INTERVENTIONS


- Remdesivir as per ID/Pulmonary developed protocols


- systemic steroids for severe COVID-19 infection empirically


- follow repeat COVID tests results


- zinc and vitamin C supplementation


- Monitor inflammatory markers per facility protocol - ferritin, Ddimer, CRP


- therapeutic anticoagulation per system Protocol based on d-dimer and clinical 

considerations


- Continue contact and airborne isolation 





.... Re-evaluate in am & prn











Subjective


Date of service: 04/21/21


Principal diagnosis: Ac. hypoxemic resp failure; Ac. encephalopathy; Acute CVA; 

Metast. lung CA


Interval history: 





Patient is seen today for: Acute hypoxemic respiratory failure; Acute 

encephalopathy; Acute cerebrovascular accident; Metastatic pulmonary nodules 

(lung cancer per daughter)





Seen and examined at bedside; 24hour events reviewed; nursing and respiratory 

care staff consulted; no adverse overnight events reported to me; resting 

peacefully in bed; doing well post extubation; AMS is persistent; on 40% venti-

mask; seen by hospice team and evaluation ongoing





Objective


                               Vital Signs - 12hr











  04/21/21 04/21/21 04/21/21





  01:31 02:00 02:31


 


Temperature   


 


Pulse Rate 85 83 82


 


Respiratory 21 21 19





Rate   


 


Blood Pressure 112/46 112/55 112/55


 


O2 Sat by Pulse 98  95





Oximetry   














  04/21/21 04/21/21 04/21/21





  03:00 03:32 03:52


 


Temperature   99.1 F


 


Pulse Rate 81 85 


 


Respiratory 17 21 





Rate   


 


Blood Pressure 112/55 125/58 


 


O2 Sat by Pulse 96 93 





Oximetry   














  04/21/21 04/21/21 04/21/21





  04:00 04:30 05:00


 


Temperature   


 


Pulse Rate 86 81 120 H


 


Respiratory 21 16 23





Rate   


 


Blood Pressure 134/63 134/63 132/57


 


O2 Sat by Pulse 95 96 97





Oximetry   














  04/21/21 04/21/21 04/21/21





  05:30 06:00 07:00


 


Temperature   


 


Pulse Rate 88 91 H 86


 


Respiratory 21 23 18





Rate   


 


Blood Pressure 132/57 135/54 131/44


 


O2 Sat by Pulse 95 94 97





Oximetry   














  04/21/21 04/21/21 04/21/21





  07:46 08:00 08:21


 


Temperature 100.4 F H  


 


Pulse Rate  83 


 


Respiratory  19 





Rate   


 


Blood Pressure  121/40 


 


O2 Sat by Pulse  96 93





Oximetry   














  04/21/21 04/21/21 04/21/21





  09:00 10:00 11:00


 


Temperature   


 


Pulse Rate 93 H 93 H 92 H


 


Respiratory 20 20 20





Rate   


 


Blood Pressure 110/52 108/49 111/49


 


O2 Sat by Pulse 98  





Oximetry   














  04/21/21





  12:30


 


Temperature 98.0 F


 


Pulse Rate 


 


Respiratory 





Rate 


 


Blood Pressure 


 


O2 Sat by Pulse 





Oximetry 











Constitutional: other (elderly female without increased respiratory effort at 

rest on MVS)


Eyes: non-icteric


ENT: oropharynx moist, other (extubated)


Neck: supple, no JVD


Effort: mildly labored


Ascultation: Bilateral: diminished breath sounds, rhonchi (scant)


Percussion: Bilateral: not dull


Cardiovascular: regular rate and rhythm


Gastrointestinal: normoactive bowel sounds, soft, non-tender, non-distended


Integumentary: normal


Extremities: no cyanosis, no edema, pulses normal, no ischemia or petechiae


Neurologic: pupils equal and round, unable to assess


Psychiatric: other (unable to assess re: AMS)


CBC and BMP: 


                                 04/17/21 04:19





                                 04/20/21 14:35


ABG, PT/INR, D-dimer: 


ABG











ABG pH  7.496  (7.320-7.450)  H  04/20/21  14:30    


 


POC ABG pCO2  36.2 mmHg (32.0-48.0)   04/20/21  14:30    


 


POC ABG pO2  58.6 mmHg ()  L  04/20/21  14:30    


 


POC ABG HCO3  27.3   04/20/21  14:30    


 


ABG O2 Saturation  92.8  (0-100)   04/20/21  14:30    





PT/INR, D-dimer











PT  12.8 Sec. (12.2-14.9)   04/16/21  14:06    


 


INR  0.98  (0.87-1.13)   04/16/21  14:06    








Abnormal lab findings: 


                                  Abnormal Labs











  04/16/21 04/16/21 04/16/21





  14:00 14:06 14:06


 


RBC   5.27 H 


 


Hgb   14.7 H 


 


Hct   43.7 H 


 


MCH   


 


RDW   15.8 H 


 


Lymph % (Auto)   6.2 L 


 


Lymph # (Auto)   0.7 L 


 


Seg Neutrophils %   89.2 H 


 


Seg Neutrophils #   9.5 H 


 


Thrombin Time    19.8 H


 


ABG pH   


 


POC ABG pCO2   


 


POC ABG pO2   


 


ABG Oxyhemoglobin   


 


ABG Sodium   


 


ABG Potassium   


 


ABG Glucose   


 


Sodium   


 


Chloride   


 


Glucose   


 


POC Glucose  150 H  


 


Calcium   


 


Phosphorus   


 


Total Creatine Kinase   


 


C-Reactive Protein   


 


Total Protein   


 


Albumin   


 


Arterial Blood Glucose   


 


Arterial Blood Ionized Calcium   


 


Coronavirus (PCR)   














  04/16/21 04/16/21 04/17/21





  14:06 14:42 04:07


 


RBC   


 


Hgb   


 


Hct   


 


MCH   


 


RDW   


 


Lymph % (Auto)   


 


Lymph # (Auto)   


 


Seg Neutrophils %   


 


Seg Neutrophils #   


 


Thrombin Time   


 


ABG pH   


 


POC ABG pCO2   


 


POC ABG pO2   71.3 L 


 


ABG Oxyhemoglobin   92.6 L 


 


ABG Sodium   


 


ABG Potassium   


 


ABG Glucose   118 H 


 


Sodium  129 L  


 


Chloride  93 L  


 


Glucose  155 H  


 


POC Glucose    50 L


 


Calcium   


 


Phosphorus   


 


Total Creatine Kinase  185 H  


 


C-Reactive Protein   


 


Total Protein  10.4 H  


 


Albumin  3.7 L  


 


Arterial Blood Glucose   118 H 


 


Arterial Blood Ionized Calcium   4.4 L 


 


Coronavirus (PCR)   














  04/17/21 04/17/21 04/17/21





  04:19 04:19 05:28


 


RBC   


 


Hgb   


 


Hct   


 


MCH  26 L  


 


RDW  15.9 H  


 


Lymph % (Auto)  8.1 L  


 


Lymph # (Auto)  0.8 L  


 


Seg Neutrophils %  84.8 H  


 


Seg Neutrophils #  8.3 H  


 


Thrombin Time   


 


ABG pH   


 


POC ABG pCO2   


 


POC ABG pO2   


 


ABG Oxyhemoglobin   


 


ABG Sodium   


 


ABG Potassium   


 


ABG Glucose   


 


Sodium   


 


Chloride   


 


Glucose   53 L 


 


POC Glucose    156 H


 


Calcium   8.1 L 


 


Phosphorus   


 


Total Creatine Kinase   


 


C-Reactive Protein   


 


Total Protein   


 


Albumin   2.7 L 


 


Arterial Blood Glucose   


 


Arterial Blood Ionized Calcium   


 


Coronavirus (PCR)   














  04/17/21 04/17/21 04/17/21





  06:20 11:43 15:39


 


RBC   


 


Hgb   


 


Hct   


 


MCH   


 


RDW   


 


Lymph % (Auto)   


 


Lymph # (Auto)   


 


Seg Neutrophils %   


 


Seg Neutrophils #   


 


Thrombin Time   


 


ABG pH  7.486 H  


 


POC ABG pCO2  31.5 L  


 


POC ABG pO2   


 


ABG Oxyhemoglobin   


 


ABG Sodium   


 


ABG Potassium   


 


ABG Glucose  58 L  


 


Sodium   


 


Chloride   


 


Glucose   


 


POC Glucose   180 H 


 


Calcium   


 


Phosphorus    1.90 L


 


Total Creatine Kinase   


 


C-Reactive Protein    4.80 H


 


Total Protein   


 


Albumin   


 


Arterial Blood Glucose  58 L  


 


Arterial Blood Ionized Calcium   


 


Coronavirus (PCR)   














  04/17/21 04/17/21 04/17/21





  17:01 23:24 Unknown


 


RBC   


 


Hgb   


 


Hct   


 


MCH   


 


RDW   


 


Lymph % (Auto)   


 


Lymph # (Auto)   


 


Seg Neutrophils %   


 


Seg Neutrophils #   


 


Thrombin Time   


 


ABG pH   


 


POC ABG pCO2   


 


POC ABG pO2   


 


ABG Oxyhemoglobin   


 


ABG Sodium   


 


ABG Potassium   


 


ABG Glucose   


 


Sodium   


 


Chloride   


 


Glucose   


 


POC Glucose  227 H  256 H 


 


Calcium   


 


Phosphorus   


 


Total Creatine Kinase   


 


C-Reactive Protein   


 


Total Protein   


 


Albumin   


 


Arterial Blood Glucose   


 


Arterial Blood Ionized Calcium   


 


Coronavirus (PCR)    Positive A














  04/18/21 04/18/21 04/18/21





  03:58 05:13 12:02


 


RBC   


 


Hgb   


 


Hct   


 


MCH   


 


RDW   


 


Lymph % (Auto)   


 


Lymph # (Auto)   


 


Seg Neutrophils %   


 


Seg Neutrophils #   


 


Thrombin Time   


 


ABG pH  7.525 H  


 


POC ABG pCO2   


 


POC ABG pO2   


 


ABG Oxyhemoglobin   


 


ABG Sodium  132.4 L  


 


ABG Potassium   


 


ABG Glucose  204 H  


 


Sodium   


 


Chloride   


 


Glucose   


 


POC Glucose   190 H  218 H


 


Calcium   


 


Phosphorus   


 


Total Creatine Kinase   


 


C-Reactive Protein   


 


Total Protein   


 


Albumin   


 


Arterial Blood Glucose  204 H  


 


Arterial Blood Ionized Calcium   


 


Coronavirus (PCR)   














  04/18/21 04/18/21 04/19/21





  17:05 23:35 03:42


 


RBC   


 


Hgb   


 


Hct   


 


MCH   


 


RDW   


 


Lymph % (Auto)   


 


Lymph # (Auto)   


 


Seg Neutrophils %   


 


Seg Neutrophils #   


 


Thrombin Time   


 


ABG pH    7.503 H


 


POC ABG pCO2   


 


POC ABG pO2    63.5 L


 


ABG Oxyhemoglobin   


 


ABG Sodium    128.6 L


 


ABG Potassium   


 


ABG Glucose    227 H


 


Sodium   


 


Chloride   


 


Glucose   


 


POC Glucose  224 H  237 H 


 


Calcium   


 


Phosphorus   


 


Total Creatine Kinase   


 


C-Reactive Protein   


 


Total Protein   


 


Albumin   


 


Arterial Blood Glucose    227 H


 


Arterial Blood Ionized Calcium   


 


Coronavirus (PCR)   














  04/19/21 04/19/21 04/19/21





  05:23 11:44 14:27


 


RBC   


 


Hgb   


 


Hct   


 


MCH   


 


RDW   


 


Lymph % (Auto)   


 


Lymph # (Auto)   


 


Seg Neutrophils %   


 


Seg Neutrophils #   


 


Thrombin Time   


 


ABG pH    7.481 H


 


POC ABG pCO2   


 


POC ABG pO2    69.1 L


 


ABG Oxyhemoglobin   


 


ABG Sodium    126.7 L


 


ABG Potassium   


 


ABG Glucose    329 H


 


Sodium   


 


Chloride   


 


Glucose   


 


POC Glucose  221 H  240 H 


 


Calcium   


 


Phosphorus   


 


Total Creatine Kinase   


 


C-Reactive Protein   


 


Total Protein   


 


Albumin   


 


Arterial Blood Glucose    329 H


 


Arterial Blood Ionized Calcium   


 


Coronavirus (PCR)   














  04/19/21 04/19/21 04/20/21





  17:32 18:38 00:05


 


RBC   


 


Hgb   


 


Hct   


 


MCH   


 


RDW   


 


Lymph % (Auto)   


 


Lymph # (Auto)   


 


Seg Neutrophils %   


 


Seg Neutrophils #   


 


Thrombin Time   


 


ABG pH   


 


POC ABG pCO2   


 


POC ABG pO2   


 


ABG Oxyhemoglobin   


 


ABG Sodium   


 


ABG Potassium   


 


ABG Glucose   


 


Sodium   


 


Chloride   


 


Glucose   


 


POC Glucose  221 H  196 H  202 H


 


Calcium   


 


Phosphorus   


 


Total Creatine Kinase   


 


C-Reactive Protein   


 


Total Protein   


 


Albumin   


 


Arterial Blood Glucose   


 


Arterial Blood Ionized Calcium   


 


Coronavirus (PCR)   














  04/20/21 04/20/21 04/20/21





  04:00 05:23 12:13


 


RBC   


 


Hgb   


 


Hct   


 


MCH   


 


RDW   


 


Lymph % (Auto)   


 


Lymph # (Auto)   


 


Seg Neutrophils %   


 


Seg Neutrophils #   


 


Thrombin Time   


 


ABG pH  7.486 H  


 


POC ABG pCO2   


 


POC ABG pO2  76.3 L  


 


ABG Oxyhemoglobin   


 


ABG Sodium  128.7 L  


 


ABG Potassium   


 


ABG Glucose  205 H  


 


Sodium   


 


Chloride   


 


Glucose   


 


POC Glucose   177 H  161 H


 


Calcium   


 


Phosphorus   


 


Total Creatine Kinase   


 


C-Reactive Protein   


 


Total Protein   


 


Albumin   


 


Arterial Blood Glucose  205 H  


 


Arterial Blood Ionized Calcium   


 


Coronavirus (PCR)   














  04/20/21 04/20/21 04/20/21





  14:30 14:35 17:25


 


RBC   


 


Hgb   


 


Hct   


 


MCH   


 


RDW   


 


Lymph % (Auto)   


 


Lymph # (Auto)   


 


Seg Neutrophils %   


 


Seg Neutrophils #   


 


Thrombin Time   


 


ABG pH  7.496 H  


 


POC ABG pCO2   


 


POC ABG pO2  58.6 L  


 


ABG Oxyhemoglobin  91.6 L  


 


ABG Sodium  129.5 L  


 


ABG Potassium  4.6 H  


 


ABG Glucose  165 H  


 


Sodium   133 L 


 


Chloride   96.5 L 


 


Glucose   152 H 


 


POC Glucose    143 H


 


Calcium   8.1 L 


 


Phosphorus   


 


Total Creatine Kinase   


 


C-Reactive Protein   


 


Total Protein   


 


Albumin   


 


Arterial Blood Glucose  165 H  


 


Arterial Blood Ionized Calcium   


 


Coronavirus (PCR)   














  04/20/21 04/20/21 04/21/21





  19:11 23:29 05:25


 


RBC   


 


Hgb   


 


Hct   


 


MCH   


 


RDW   


 


Lymph % (Auto)   


 


Lymph # (Auto)   


 


Seg Neutrophils %   


 


Seg Neutrophils #   


 


Thrombin Time   


 


ABG pH   


 


POC ABG pCO2   


 


POC ABG pO2   


 


ABG Oxyhemoglobin   


 


ABG Sodium   


 


ABG Potassium   


 


ABG Glucose   


 


Sodium   


 


Chloride   


 


Glucose   


 


POC Glucose  157 H  176 H  147 H


 


Calcium   


 


Phosphorus   


 


Total Creatine Kinase   


 


C-Reactive Protein   


 


Total Protein   


 


Albumin   


 


Arterial Blood Glucose   


 


Arterial Blood Ionized Calcium   


 


Coronavirus (PCR)   














  04/21/21





  11:48


 


RBC 


 


Hgb 


 


Hct 


 


MCH 


 


RDW 


 


Lymph % (Auto) 


 


Lymph # (Auto) 


 


Seg Neutrophils % 


 


Seg Neutrophils # 


 


Thrombin Time 


 


ABG pH 


 


POC ABG pCO2 


 


POC ABG pO2 


 


ABG Oxyhemoglobin 


 


ABG Sodium 


 


ABG Potassium 


 


ABG Glucose 


 


Sodium 


 


Chloride 


 


Glucose 


 


POC Glucose  229 H


 


Calcium 


 


Phosphorus 


 


Total Creatine Kinase 


 


C-Reactive Protein 


 


Total Protein 


 


Albumin 


 


Arterial Blood Glucose 


 


Arterial Blood Ionized Calcium 


 


Coronavirus (PCR) 











Chest x-ray: image reviewed (stable)


Allied health notes reviewed: nursing

## 2021-04-21 NOTE — DISCHARGE SUMMARY
Providers





- Providers


Date of Admission: 


04/16/21 17:04





Date of discharge: 04/21/21


Attending physician: 


MAIKOL MALDONADO





                                        





04/16/21 17:02


Consult to Physician [CONS] Routine 


   Comment: 


   Consulting Provider: DIANDRA GUIDO


   Physician Instructions: 


   Reason For Exam: ICU management





04/16/21 18:27


Consult to Dietitian/Nutrition [CONS] Routine 


   Physician Instructions: 


   Reason For Exam: 


   Reason for Consult: Evaluate nutritional intake





04/17/21 15:00


Consult to Physician [CONS] Routine 


   Comment: 


   Consulting Provider: WENDY MONTANEZ


   Physician Instructions: 


   Reason For Exam: COVID-19 infection





04/17/21 15:03


Consult to Dietitian/Nutrition [CONS] Routine 


   Physician Instructions: 


   Reason For Exam: 


   Reason for Consult: Write/Manage Tube Feeding





04/17/21 15:54


Consult to Case Management [CONS] Routine 


   Services Needed at Discharge: Other


   Additional Physician Instructions: hospice





04/19/21 16:54


Consult to Physician [CONS] Routine 


   Comment: 


   Consulting Provider: LASHELL ORELLANA


   Physician Instructions: 


   Reason For Exam: encephalopathy/ams











Primary care physician: 


PRIMARY CARE MD








Hospitalization


Condition: Stable


Hospital course: 


This is a 71-year-old female with CVA complicated by aphasia, vascular dementia,

 cerebral sclerosis, metastatic lung neoplasm who presented to the emergency 

department on 4/16 was found unresponsive by the family on 4/16.  Patient was 

found to have a focal neurological deficit with symptoms consistent with CVA, 

hypoxemia and inability to protect her airway the patient was intubated and 

placed on mechanical ventilation.  Patient was found to have acute 

encephalopathy, metastatic lung cancer.  Patient was admitted to the ICU as a 

COVID-19 PUI with consults to infectious disease, neurology and [facility]. On 4/17 

family requested DNR status and hospice placement. on 4/19 patient was febrile 

to 102.1 and she was started on antibiotic therapy for CAP coverage for 5 days. 

 Given COVID-19 positive status patient would not qualify for inpatient hospice 

care and will need home hospice. On 4/40 she was on a CPAP trial and extubated 

the patient to BiPAP nightly as needed.  She is now on venti mask and hospice 

has evaluated her. She will be diachgred home to home hospice and care will take

 over care. 





Acute hypoxemic respiratory failure 


COVID-19 infection


Metastatic lung cancer


Possible acute CVA


Metabolic encephalopathy


Hyponatremia 


History of CVA with aphasia


Vascular dementia


Severe protein calorie malnutrition


Hyponatremia


Hypochloremia





-CCM, neurology, ID consulted, appreciate recommendations


-Non contrast CT head revealed a left cerebellar calcified mass.


-Frequent neuro checks


-EEG pending


-Droplet/Contact isolation


-4/20 extubated


-Tube feeding


-abx for CAP coverage


 


Disposition: DC-50 TO HOSPICE (HOME)


Final Discharge Diagnosis (Prints w/discharge instructions): Acute hypoxemic 

respiratory failure.  COVID-19 infection.  Metastatic lung cancer.  Possible 

acute CVA.  Metabolic encephalopathy.  Hyponatremia.  History of CVA with 

aphasia.  Vascular dementia.  Severe protein calorie malnutrition.  Hyponatre

[place].  Hypochloremia


Time spent for discharge: 35





Core Measure Documentation





- Palliative Care


Palliative Care/ Comfort Measures: Hospice Care





- Core Measures


Any of the following diagnoses?: none





Exam





- Constitutional


Vitals: 


                                        











Temp Pulse Resp BP Pulse Ox


 


 98.0 F   92 H  20   111/49   98 


 


 04/21/21 12:30  04/21/21 11:00  04/21/21 11:00  04/21/21 11:00  04/21/21 09:00














- EENT


ENT: poor dentition





- Neck


Neck: Present: normal ROM





- Respiratory


Respiratory effort: normal





- Cardiovascular


Rhythm: regular





- Extremities


Extremities: no ischemia, pulses intact, pulses symmetrical, No edema, normal te

mperature, normal color


Peripheral Pulses: within normal limits





- Abdominal


General gastrointestinal: Present: soft, non-tender, non-distended, normal bowel

 sounds





- Integumentary


Integumentary: Present: warm, dry





- Musculoskeletal


Musculoskeletal: other (unresponsive)





- Psychiatric


Psychiatric: other (rockwell spontanously, does not follow commands or track/focus)





Plan


Activity: advance as tolerated


Diet: advance as tolerated


Special Instructions: home hospice


Follow up with: 


PRIMARY CARE,MD [Primary Care Provider] - 3-5 Days

## 2021-04-21 NOTE — XRAY REPORT
CHEST - 1 VIEW



INDICATION:  follow up respiratory failure 



COMPARISON:

Yesterday



FINDINGS:



SUPPORT DEVICES:  Stable support device positioning.



HEART:  Stable cardiomediastinal silhouette.



LUNGS/PLEURA:  Extensive nodularity again seen throughout both lungs, unchanged.



ADDITIONAL FINDINGS:  None.



IMPRESSION:  



Unchanged exam.



Signer Name: Nimesh Oviedo MD 

Signed: 4/21/2021 2:58 AM

Workstation Name: [id number]

## 2021-04-22 NOTE — ELECTROCARDIOGRAPH REPORT
[facility]

                                       

Test Date:    2021               Test Time:    20:33:06

Pat Name:     JACKSON ECHOLS           Department:   

Patient ID:   [id number]          Room:         A259 1

Gender:       F                        Technician:   CLAUDIA

:          1950               Requested By: ESTELLA MONTEMAYOR

Order Number: [id number]              Reading MD:   Regan Pepe

                                 Measurements

Intervals                              Axis          

Rate:         93                       P:            40

SC:           132                      QRS:          45

QRSD:         101                      T:            171

QT:           393                                    

QTc:          488                                    

                           Interpretive Statements

Sinus rhythm

Low voltage, extremity leads

Abnormal T, consider ischemia, diffuse leads

Compared to ECG 2021 20:36:07

T-wave abnormality now present

Possible ischemia now present

Myocardial infarct finding no longer present

Electronically Signed On 2021 9:33:14 EDT by Regan Pepe